# Patient Record
Sex: FEMALE | Race: WHITE | NOT HISPANIC OR LATINO | Employment: OTHER | URBAN - METROPOLITAN AREA
[De-identification: names, ages, dates, MRNs, and addresses within clinical notes are randomized per-mention and may not be internally consistent; named-entity substitution may affect disease eponyms.]

---

## 2023-05-18 PROBLEM — H90.3 SENSORINEURAL HEARING LOSS, BILATERAL: Status: ACTIVE | Noted: 2023-05-18

## 2023-05-18 PROBLEM — K21.9 ESOPHAGEAL REFLUX: Status: ACTIVE | Noted: 2023-05-18

## 2023-05-18 PROBLEM — G47.00 INSOMNIA: Status: ACTIVE | Noted: 2023-05-18

## 2023-05-18 PROBLEM — C64.9 RENAL CELL CARCINOMA (MULTI): Status: ACTIVE | Noted: 2023-05-18

## 2023-05-18 PROBLEM — R53.83 FATIGUE: Status: ACTIVE | Noted: 2023-05-18

## 2023-05-18 PROBLEM — M85.80 OSTEOPENIA: Status: ACTIVE | Noted: 2023-05-18

## 2023-05-18 PROBLEM — G43.909 MIGRAINE HEADACHE: Status: ACTIVE | Noted: 2023-05-18

## 2023-05-18 PROBLEM — F33.9 RECURRENT MAJOR DEPRESSIVE DISORDER (CMS-HCC): Status: ACTIVE | Noted: 2023-05-18

## 2023-05-18 PROBLEM — Z85.3 PERSONAL HISTORY OF BREAST CANCER: Status: ACTIVE | Noted: 2023-05-18

## 2023-05-18 PROBLEM — I10 HYPERTENSION: Status: ACTIVE | Noted: 2023-05-18

## 2023-05-18 NOTE — PROGRESS NOTES
Subjective   Patient ID: Jolanta Pickering is a 77 y.o. female.    HPI  Today for preop clearance for upcoming cataract surgery   Past medical history significant for  Personal history of breast cancer and renal cell cancer  Anxiety and depression  Chronic migraine headaches really with more ocular symptoms and usually gets about 1 weekly she knows how to control this they do not last long  Hypertension  Insomnia  Osteopenia      Review of Systems  Occasional chest pressure which she thinks is more food related not exertional    Objective   Physical Exam  Well-developed appears younger than her age in no acute distress  HEENT exam is unremarkable  Lungs are clear to auscultation and percussion  Cardiovascular regular rate and rhythm I detect no murmurs rubs or gallops  Periphery is without edema  There are no carotid bruits noted  EKG was performed and is normal  Lab Results   Component Value Date    WBC 4.9 01/31/2023    HGB 13.1 01/31/2023    HCT 40.6 01/31/2023    MCV 91 01/31/2023     01/31/2023     Lab Results   Component Value Date    GLUCOSE 83 01/31/2023    CALCIUM 8.8 01/31/2023     01/31/2023    K 3.8 01/31/2023    CO2 31 01/31/2023     01/31/2023    BUN 15 01/31/2023    CREATININE 0.75 01/31/2023      Assessment/Plan   #1 preop clearance for upcoming planned elective cataract surgery.  This patient is at no increased risk of morbidity or mortality for upcoming planned procedure she is medically cleared  2.  Hypertension excellent control continue current regimen some of this may be due to the recent weight loss which was intentional  3.  Migraine headaches stable  4.  Occasional chest discomfort I agree this probably is more gastroesophageal reflux she is going to monitor her symptoms more routinely  30 minutes were spent with patient of which greater than 50% was spent in counseling and coordination of care

## 2023-05-22 ENCOUNTER — OFFICE VISIT (OUTPATIENT)
Dept: PRIMARY CARE | Facility: CLINIC | Age: 78
End: 2023-05-22
Payer: MEDICARE

## 2023-05-22 VITALS
OXYGEN SATURATION: 98 % | SYSTOLIC BLOOD PRESSURE: 110 MMHG | BODY MASS INDEX: 27.46 KG/M2 | HEART RATE: 78 BPM | DIASTOLIC BLOOD PRESSURE: 80 MMHG | HEIGHT: 62 IN | WEIGHT: 149.2 LBS

## 2023-05-22 DIAGNOSIS — H26.9 CATARACT, UNSPECIFIED CATARACT TYPE, UNSPECIFIED LATERALITY: ICD-10-CM

## 2023-05-22 DIAGNOSIS — G43.809 OTHER MIGRAINE WITHOUT STATUS MIGRAINOSUS, NOT INTRACTABLE: ICD-10-CM

## 2023-05-22 DIAGNOSIS — Z00.00 ROUTINE HEALTH MAINTENANCE: ICD-10-CM

## 2023-05-22 DIAGNOSIS — R07.89 CHEST PRESSURE: Primary | ICD-10-CM

## 2023-05-22 DIAGNOSIS — I10 PRIMARY HYPERTENSION: ICD-10-CM

## 2023-05-22 DIAGNOSIS — K21.9 GASTROESOPHAGEAL REFLUX DISEASE, UNSPECIFIED WHETHER ESOPHAGITIS PRESENT: ICD-10-CM

## 2023-05-22 PROCEDURE — 1160F RVW MEDS BY RX/DR IN RCRD: CPT | Performed by: INTERNAL MEDICINE

## 2023-05-22 PROCEDURE — 3074F SYST BP LT 130 MM HG: CPT | Performed by: INTERNAL MEDICINE

## 2023-05-22 PROCEDURE — 99214 OFFICE O/P EST MOD 30 MIN: CPT | Performed by: INTERNAL MEDICINE

## 2023-05-22 PROCEDURE — 1159F MED LIST DOCD IN RCRD: CPT | Performed by: INTERNAL MEDICINE

## 2023-05-22 PROCEDURE — 93000 ELECTROCARDIOGRAM COMPLETE: CPT | Performed by: INTERNAL MEDICINE

## 2023-05-22 PROCEDURE — 3079F DIAST BP 80-89 MM HG: CPT | Performed by: INTERNAL MEDICINE

## 2023-05-22 RX ORDER — CITALOPRAM 20 MG/1
20 TABLET, FILM COATED ORAL
COMMUNITY
Start: 2019-09-16 | End: 2023-08-31 | Stop reason: SDUPTHER

## 2023-05-22 RX ORDER — TRETINOIN 0.25 MG/G
CREAM TOPICAL
COMMUNITY
Start: 2020-05-11 | End: 2024-01-12 | Stop reason: SDUPTHER

## 2023-05-22 RX ORDER — VERAPAMIL HYDROCHLORIDE 100 MG/1
2 CAPSULE, EXTENDED RELEASE ORAL NIGHTLY
COMMUNITY
Start: 2019-06-25 | End: 2023-10-12

## 2023-05-22 RX ORDER — BUPROPION HYDROCHLORIDE 150 MG/1
150 TABLET ORAL DAILY
COMMUNITY
Start: 2023-03-25 | End: 2024-03-08

## 2023-05-22 RX ORDER — TRAZODONE HYDROCHLORIDE 100 MG/1
100 TABLET ORAL
COMMUNITY
Start: 2020-01-30 | End: 2023-10-29

## 2023-05-22 RX ORDER — DOCUSATE SODIUM 100 MG/1
CAPSULE, LIQUID FILLED ORAL
COMMUNITY

## 2023-05-22 ASSESSMENT — PAIN SCALES - GENERAL: PAINLEVEL: 0-NO PAIN

## 2023-08-30 ENCOUNTER — TELEPHONE (OUTPATIENT)
Dept: PRIMARY CARE | Facility: CLINIC | Age: 78
End: 2023-08-30
Payer: MEDICARE

## 2023-08-30 NOTE — TELEPHONE ENCOUNTER
She is having aches and and pains and is coming in tomorrow but would you want to do any bw before?

## 2023-08-31 ENCOUNTER — APPOINTMENT (OUTPATIENT)
Dept: PRIMARY CARE | Facility: CLINIC | Age: 78
End: 2023-08-31
Payer: MEDICARE

## 2023-08-31 ENCOUNTER — LAB (OUTPATIENT)
Dept: LAB | Facility: LAB | Age: 78
End: 2023-08-31
Payer: MEDICARE

## 2023-08-31 ENCOUNTER — OFFICE VISIT (OUTPATIENT)
Dept: PRIMARY CARE | Facility: CLINIC | Age: 78
End: 2023-08-31
Payer: MEDICARE

## 2023-08-31 VITALS
SYSTOLIC BLOOD PRESSURE: 128 MMHG | TEMPERATURE: 98 F | BODY MASS INDEX: 26.52 KG/M2 | WEIGHT: 145 LBS | DIASTOLIC BLOOD PRESSURE: 72 MMHG

## 2023-08-31 DIAGNOSIS — M79.10 MYALGIA: Primary | ICD-10-CM

## 2023-08-31 DIAGNOSIS — R53.83 OTHER FATIGUE: ICD-10-CM

## 2023-08-31 DIAGNOSIS — M79.10 MYALGIA: ICD-10-CM

## 2023-08-31 DIAGNOSIS — F33.9 RECURRENT MAJOR DEPRESSIVE DISORDER, REMISSION STATUS UNSPECIFIED (CMS-HCC): ICD-10-CM

## 2023-08-31 LAB
ALANINE AMINOTRANSFERASE (SGPT) (U/L) IN SER/PLAS: 7 U/L (ref 7–45)
ALBUMIN (G/DL) IN SER/PLAS: 3.8 G/DL (ref 3.4–5)
ALKALINE PHOSPHATASE (U/L) IN SER/PLAS: 26 U/L (ref 33–136)
ANION GAP IN SER/PLAS: 8 MMOL/L (ref 10–20)
ASPARTATE AMINOTRANSFERASE (SGOT) (U/L) IN SER/PLAS: 10 U/L (ref 9–39)
BILIRUBIN TOTAL (MG/DL) IN SER/PLAS: 0.5 MG/DL (ref 0–1.2)
C REACTIVE PROTEIN (MG/L) IN SER/PLAS: <0.1 MG/DL
CALCIUM (MG/DL) IN SER/PLAS: 8.7 MG/DL (ref 8.6–10.3)
CARBON DIOXIDE, TOTAL (MMOL/L) IN SER/PLAS: 31 MMOL/L (ref 21–32)
CHLORIDE (MMOL/L) IN SER/PLAS: 103 MMOL/L (ref 98–107)
CREATINE KINASE (U/L) IN SER/PLAS: 35 U/L (ref 0–215)
CREATININE (MG/DL) IN SER/PLAS: 0.78 MG/DL (ref 0.5–1.05)
ERYTHROCYTE DISTRIBUTION WIDTH (RATIO) BY AUTOMATED COUNT: 12 % (ref 11.5–14.5)
ERYTHROCYTE MEAN CORPUSCULAR HEMOGLOBIN CONCENTRATION (G/DL) BY AUTOMATED: 31.3 G/DL (ref 32–36)
ERYTHROCYTE MEAN CORPUSCULAR VOLUME (FL) BY AUTOMATED COUNT: 93 FL (ref 80–100)
ERYTHROCYTES (10*6/UL) IN BLOOD BY AUTOMATED COUNT: 4.15 X10E12/L (ref 4–5.2)
GFR FEMALE: 79 ML/MIN/1.73M2
GLUCOSE (MG/DL) IN SER/PLAS: 80 MG/DL (ref 74–99)
HEMATOCRIT (%) IN BLOOD BY AUTOMATED COUNT: 38.6 % (ref 36–46)
HEMOGLOBIN (G/DL) IN BLOOD: 12.1 G/DL (ref 12–16)
LEUKOCYTES (10*3/UL) IN BLOOD BY AUTOMATED COUNT: 4.5 X10E9/L (ref 4.4–11.3)
PLATELETS (10*3/UL) IN BLOOD AUTOMATED COUNT: 192 X10E9/L (ref 150–450)
POTASSIUM (MMOL/L) IN SER/PLAS: 3.8 MMOL/L (ref 3.5–5.3)
PROTEIN TOTAL: 6.3 G/DL (ref 6.4–8.2)
SEDIMENTATION RATE, ERYTHROCYTE: 8 MM/H (ref 0–30)
SODIUM (MMOL/L) IN SER/PLAS: 138 MMOL/L (ref 136–145)
THYROTROPIN (MIU/L) IN SER/PLAS BY DETECTION LIMIT <= 0.05 MIU/L: 2.13 MIU/L (ref 0.44–3.98)
UREA NITROGEN (MG/DL) IN SER/PLAS: 16 MG/DL (ref 6–23)

## 2023-08-31 PROCEDURE — 3078F DIAST BP <80 MM HG: CPT | Performed by: INTERNAL MEDICINE

## 2023-08-31 PROCEDURE — 85652 RBC SED RATE AUTOMATED: CPT

## 2023-08-31 PROCEDURE — 84443 ASSAY THYROID STIM HORMONE: CPT

## 2023-08-31 PROCEDURE — 86140 C-REACTIVE PROTEIN: CPT

## 2023-08-31 PROCEDURE — 85027 COMPLETE CBC AUTOMATED: CPT

## 2023-08-31 PROCEDURE — 82550 ASSAY OF CK (CPK): CPT

## 2023-08-31 PROCEDURE — 1126F AMNT PAIN NOTED NONE PRSNT: CPT | Performed by: INTERNAL MEDICINE

## 2023-08-31 PROCEDURE — 80053 COMPREHEN METABOLIC PANEL: CPT

## 2023-08-31 PROCEDURE — 1036F TOBACCO NON-USER: CPT | Performed by: INTERNAL MEDICINE

## 2023-08-31 PROCEDURE — 36415 COLL VENOUS BLD VENIPUNCTURE: CPT

## 2023-08-31 PROCEDURE — 99213 OFFICE O/P EST LOW 20 MIN: CPT | Performed by: INTERNAL MEDICINE

## 2023-08-31 PROCEDURE — 3074F SYST BP LT 130 MM HG: CPT | Performed by: INTERNAL MEDICINE

## 2023-08-31 PROCEDURE — 1160F RVW MEDS BY RX/DR IN RCRD: CPT | Performed by: INTERNAL MEDICINE

## 2023-08-31 PROCEDURE — 1159F MED LIST DOCD IN RCRD: CPT | Performed by: INTERNAL MEDICINE

## 2023-08-31 RX ORDER — CITALOPRAM 20 MG/1
20 TABLET, FILM COATED ORAL
Qty: 90 TABLET | Refills: 3 | Status: SHIPPED | OUTPATIENT
Start: 2023-08-31

## 2023-08-31 NOTE — PROGRESS NOTES
Subjective   Patient ID: Jolanta Pickering is a 77 y.o. female.    HPI  1 week of feeling poorly  achy all over seems to feel like it is worse at the end of the night  She does not feel her strength has been affected  She denies fevers or chills  Energy level has been low as well which is unlike her  Occasional headache no change in vision  She remains very active but just time she has felt like she is going to cancel her normal activities    Review of Systems    Objective   Physical Exam  Well-developed appears younger than age no acute distress  HEENT exam is unremarkable the temporal arteries are not palpable  Lungs are clear  Cardiovascular regular rate and rhythm  Periphery without edema  There is no joint erythema or swelling noted there is good range of motion there is some pain to palpation bilaterally in the greater trochanters strength is 5 of 5 without deficits noted and gait is normal  Assessment/Plan     #1Myalgias more than arthralgias and fatigue etiology unclear no fevers no chills just not feeling herself I would like to do some routine blood work does not sound like PMR as seems to be worse in the evening her strength is good but we will check sed rate CRP CBC thyroid functions and comprehensive metabolic profile certainly possible this is some nondescript viral syndrome as well  2.  Hypertension good control continue current regimen  3.  Weight loss intentional congratulated her on her intentional weight loss  20 minutes were spent with patient of which greater than 50% was spent in counseling and coordination of care  This note was partially generated using the Dragon voice recognition system.  There may be some incorrect wording ,grammar, spelling or punctuation errors that were not corrected prior to committing the note to the medical record.

## 2023-08-31 NOTE — PROGRESS NOTES
Attempted to call the patient to get her scheduled. Unable to LVM.    Body aches, headache, fatigue for a week

## 2023-09-28 ENCOUNTER — TELEPHONE (OUTPATIENT)
Dept: PRIMARY CARE | Facility: CLINIC | Age: 78
End: 2023-09-28
Payer: MEDICARE

## 2023-09-28 NOTE — TELEPHONE ENCOUNTER
Do you want her the get the RSV vaccine?   Pt arrives to the ED via EMS with hypoxia. EMS reports that the pt was found by her nurse at [facility] with her oxygen saturation at 70% on room air. The pt denies shortness of breath nor chest pain. The pt states that she tested positive for covid two days ago.

## 2023-10-11 DIAGNOSIS — G43.119 INTRACTABLE MIGRAINE WITH AURA WITHOUT STATUS MIGRAINOSUS: Primary | ICD-10-CM

## 2023-10-12 RX ORDER — VERAPAMIL HYDROCHLORIDE 100 MG/1
100 CAPSULE, EXTENDED RELEASE ORAL DAILY
Qty: 90 CAPSULE | Refills: 3 | Status: SHIPPED | OUTPATIENT
Start: 2023-10-12

## 2023-10-28 DIAGNOSIS — F51.01 PRIMARY INSOMNIA: Primary | ICD-10-CM

## 2023-10-29 RX ORDER — TRAZODONE HYDROCHLORIDE 100 MG/1
TABLET ORAL
Qty: 180 TABLET | Refills: 0 | Status: SHIPPED | OUTPATIENT
Start: 2023-10-29 | End: 2024-03-08

## 2023-11-17 ENCOUNTER — ANCILLARY PROCEDURE (OUTPATIENT)
Dept: RADIOLOGY | Facility: CLINIC | Age: 78
End: 2023-11-17
Payer: MEDICARE

## 2023-11-17 DIAGNOSIS — Z12.31 ENCOUNTER FOR SCREENING MAMMOGRAM FOR MALIGNANT NEOPLASM OF BREAST: ICD-10-CM

## 2023-11-17 PROCEDURE — 77067 SCR MAMMO BI INCL CAD: CPT

## 2023-11-17 PROCEDURE — 77063 BREAST TOMOSYNTHESIS BI: CPT | Mod: BILATERAL PROCEDURE | Performed by: RADIOLOGY

## 2023-11-17 PROCEDURE — 77067 SCR MAMMO BI INCL CAD: CPT | Mod: BILATERAL PROCEDURE | Performed by: RADIOLOGY

## 2023-12-30 ENCOUNTER — LAB (OUTPATIENT)
Dept: LAB | Facility: LAB | Age: 78
End: 2023-12-30
Payer: MEDICARE

## 2024-01-03 DIAGNOSIS — Z00.00 ROUTINE HEALTH MAINTENANCE: ICD-10-CM

## 2024-01-08 ENCOUNTER — LAB (OUTPATIENT)
Dept: LAB | Facility: LAB | Age: 79
End: 2024-01-08
Payer: MEDICARE

## 2024-01-08 DIAGNOSIS — Z00.00 ROUTINE HEALTH MAINTENANCE: ICD-10-CM

## 2024-01-08 LAB
25(OH)D3 SERPL-MCNC: 62 NG/ML (ref 30–100)
ALBUMIN SERPL BCP-MCNC: 3.9 G/DL (ref 3.4–5)
ALP SERPL-CCNC: 27 U/L (ref 33–136)
ALT SERPL W P-5'-P-CCNC: 10 U/L (ref 7–45)
ANION GAP SERPL CALC-SCNC: 12 MMOL/L (ref 10–20)
AST SERPL W P-5'-P-CCNC: 11 U/L (ref 9–39)
BASOPHILS # BLD AUTO: 0.03 X10*3/UL (ref 0–0.1)
BASOPHILS NFR BLD AUTO: 0.6 %
BILIRUB SERPL-MCNC: 0.4 MG/DL (ref 0–1.2)
BUN SERPL-MCNC: 24 MG/DL (ref 6–23)
CALCIUM SERPL-MCNC: 9.2 MG/DL (ref 8.6–10.6)
CHLORIDE SERPL-SCNC: 105 MMOL/L (ref 98–107)
CHOLEST SERPL-MCNC: 179 MG/DL (ref 0–199)
CHOLESTEROL/HDL RATIO: 4.2
CO2 SERPL-SCNC: 30 MMOL/L (ref 21–32)
CREAT SERPL-MCNC: 0.88 MG/DL (ref 0.5–1.05)
CRP SERPL HS-MCNC: 0.6 MG/L
EGFRCR SERPLBLD CKD-EPI 2021: 68 ML/MIN/1.73M*2
EOSINOPHIL # BLD AUTO: 0.03 X10*3/UL (ref 0–0.4)
EOSINOPHIL NFR BLD AUTO: 0.6 %
ERYTHROCYTE [DISTWIDTH] IN BLOOD BY AUTOMATED COUNT: 12 % (ref 11.5–14.5)
EST. AVERAGE GLUCOSE BLD GHB EST-MCNC: 103 MG/DL
GLUCOSE SERPL-MCNC: 88 MG/DL (ref 74–99)
HBA1C MFR BLD: 5.2 %
HCT VFR BLD AUTO: 38.4 % (ref 36–46)
HDLC SERPL-MCNC: 42.4 MG/DL
HGB BLD-MCNC: 12.1 G/DL (ref 12–16)
IMM GRANULOCYTES # BLD AUTO: 0.01 X10*3/UL (ref 0–0.5)
IMM GRANULOCYTES NFR BLD AUTO: 0.2 % (ref 0–0.9)
LDLC SERPL CALC-MCNC: 120 MG/DL
LYMPHOCYTES # BLD AUTO: 1.53 X10*3/UL (ref 0.8–3)
LYMPHOCYTES NFR BLD AUTO: 30.2 %
MCH RBC QN AUTO: 29.3 PG (ref 26–34)
MCHC RBC AUTO-ENTMCNC: 31.5 G/DL (ref 32–36)
MCV RBC AUTO: 93 FL (ref 80–100)
MONOCYTES # BLD AUTO: 0.46 X10*3/UL (ref 0.05–0.8)
MONOCYTES NFR BLD AUTO: 9.1 %
NEUTROPHILS # BLD AUTO: 3.01 X10*3/UL (ref 1.6–5.5)
NEUTROPHILS NFR BLD AUTO: 59.3 %
NON HDL CHOLESTEROL: 137 MG/DL (ref 0–149)
NRBC BLD-RTO: 0 /100 WBCS (ref 0–0)
PLATELET # BLD AUTO: 209 X10*3/UL (ref 150–450)
POTASSIUM SERPL-SCNC: 4.3 MMOL/L (ref 3.5–5.3)
PROT SERPL-MCNC: 6.3 G/DL (ref 6.4–8.2)
RBC # BLD AUTO: 4.13 X10*6/UL (ref 4–5.2)
SODIUM SERPL-SCNC: 143 MMOL/L (ref 136–145)
TRIGL SERPL-MCNC: 82 MG/DL (ref 0–149)
TSH SERPL-ACNC: 2.55 MIU/L (ref 0.44–3.98)
VLDL: 16 MG/DL (ref 0–40)
WBC # BLD AUTO: 5.1 X10*3/UL (ref 4.4–11.3)

## 2024-01-08 PROCEDURE — 36415 COLL VENOUS BLD VENIPUNCTURE: CPT

## 2024-01-08 PROCEDURE — 83036 HEMOGLOBIN GLYCOSYLATED A1C: CPT

## 2024-01-08 PROCEDURE — 82306 VITAMIN D 25 HYDROXY: CPT

## 2024-01-08 PROCEDURE — 86141 C-REACTIVE PROTEIN HS: CPT

## 2024-01-08 PROCEDURE — 85025 COMPLETE CBC W/AUTO DIFF WBC: CPT

## 2024-01-08 PROCEDURE — 80061 LIPID PANEL: CPT

## 2024-01-08 PROCEDURE — 84443 ASSAY THYROID STIM HORMONE: CPT

## 2024-01-08 PROCEDURE — 80053 COMPREHEN METABOLIC PANEL: CPT

## 2024-01-08 ASSESSMENT — PROMIS GLOBAL HEALTH SCALE
RATE_SOCIAL_SATISFACTION: EXCELLENT
RATE_MENTAL_HEALTH: FAIR
RATE_AVERAGE_FATIGUE: MODERATE
RATE_GENERAL_HEALTH: VERY GOOD
EMOTIONAL_PROBLEMS: RARELY
CARRYOUT_PHYSICAL_ACTIVITIES: COMPLETELY
RATE_AVERAGE_PAIN: 1
RATE_QUALITY_OF_LIFE: EXCELLENT
RATE_PHYSICAL_HEALTH: GOOD
CARRYOUT_SOCIAL_ACTIVITIES: VERY GOOD

## 2024-01-09 NOTE — PROGRESS NOTES
Physical Exam    Name Jolanta Pickering    Date of Service :1/12/2024      Jolanta Pickering  78 y.o. is here for an annual physical exam  Past medical history is significant for  Breast cancer stage II 2014 lumpectomy left breast she did have radiation therapy and chemotherapy was on anastrozole   followed with oncology for a while until Dr. Alegria retired  Osteopenia had been on both alendronate and Reclast in the past probably because of the anastrozole last bone density 2023 showing worst T-score of -1.7 in the hip  Renal cell cancer 2005 status post partial nephrectomy this was found incidentally she has had no repeat occurrence  Ascending aortic aneurysm/dilatation which is followed with biannual CT scan last CT scan was 2022 plan to repeat this year August 2024 sees Dr Pepito Simon  She did have a cholecystectomy around 2017  Remote history of oophorectomy maybe for a cyst she is unsure  Headaches has been on verapamil for the headaches as well as controlling her blood pressure she did see a migraine specialist at 1 time was using too much Fioricet  Anxiety and depression citalopram had worked pretty well had been on Wellbutrin for a time after the death of her  she actually feels quite well with her current regimen she remains very active  She did last have a colonoscopy in 2018 question of repeat in 5 years but this colonoscopy was clean  Trazodone has worked well for her chronic insomnia  She is with chronic constipation takes MiraLAX on a as needed basis but sometimes will go a whole week without having a bowel movement  Overall she is feeling very well and grateful for her health and for her family  She remains very active and remains very engaged with her friend she plays bridge and multiple other activities  She will be going [place] a big cruise [place] [facility] [place] near future    She had cataract surgery this year which was very successful and pleased as well as did get a hearing aid which  has been very good as well          Past Medical History:   Diagnosis Date    Cyst of kidney, acquired     Cyst, kidney, acquired    Malignant neoplasm of upper-outer quadrant of unspecified female breast (CMS/HCC) 06/15/2014    Malignant neoplasm of upper outer quadrant of female breast    Other conditions influencing health status 06/30/2014    Seroma infection, postoperative    Personal history of diseases of the blood and blood-forming organs and certain disorders involving the immune mechanism     History of anemia    Personal history of malignant neoplasm of other organs and systems 12/30/2013    History of secondary malignant neoplasm    Personal history of other (healed) physical injury and trauma 10/14/2013    History of strain of back    Personal history of other endocrine, nutritional and metabolic disease 09/09/2014    History of hypokalemia    Personal history of other endocrine, nutritional and metabolic disease     History of hyperlipidemia    Personal history of other mental and behavioral disorders 08/08/2014    History of anxiety disorder    Personal history of other specified conditions 10/14/2013    History of diarrhea    Personal history of other specified conditions 12/23/2013    History of breast lump    Personal history of other specified conditions 12/23/2013    History of lymphadenopathy    Personal history of other specified conditions 10/14/2013    History of solitary pulmonary nodule    Secondary and unspecified malignant neoplasm of lymph node, 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    Tachycardia, unspecified 05/21/2014    Tachycardia    Vitamin D deficiency, unspecified     Vitamin D deficiency       Past Surgical History:   Procedure Laterality Date    BREAST LUMPECTOMY Left 06/30/2014    Breast Surgery Lumpectomy    CT ABDOMEN PELVIS ANGIOGRAM W AND/OR WO IV CONTRAST  10/18/2013    CT ABDOMEN PELVIS ANGIOGRAM W AND/OR WO IV CONTRAST 10/18/2013 [facility] ANCILLARY  "LEGACY    INCISIONAL BREAST BIOPSY  2013    Incisional Breast Biopsy    OTHER SURGICAL HISTORY  2013    Partial Nephrectomy    REFRACTIVE SURGERY  2013    Corneal LASIK    TUBAL LIGATION  2013    Tubal Ligation        Social History     Tobacco Use    Smoking status: Never    Smokeless tobacco: Never   Vaping Use    Vaping Use: Never used        Social History     Social History Narrative    From  since 2016     She has 2 sons 5 grandchildren 2 granddaughters and 3 grandsons    1 son and 3 grandchildren are here the other son and 2 grandchildren living in [place]    Retired  teacher  education     but worked in [employer]    Her diet is healthy overall    She does not exercise routinely but is active at home and walks her dog    She has never been a smoker    She does not drink alcohol       Family History   Problem Relation Name Age of Onset    Breast cancer Mother           at 90    Transient ischemic attack Father           90    Breast cancer Sister      Hodgkin's lymphoma Son          /78   Ht 1.575 m (5' 2\")   Wt 66.2 kg (146 lb)   LMP  (LMP Unknown)   BMI 26.70 kg/m²     Physical Exam  Physical examination  Reveals a well-developed woman in no acute distress    appearance is younger than age no acute distress  HEENT exam  Extraocular motion is intact  Tympanic membranes and external auditory canals are normal  Oropharynx is normal  There is no cervical lymphadenopathy appreciated  The thyroid is within normal limits    Lungs    clear to auscultation and percussion    Cardiovascular   regular rate and rhythm  No murmurs rubs or gallops are appreciated    Breast examination   No dominant masses nipple discharge or axillary lymphadenopathy is appreciated  Scar from remote lumpectomy left breast    Abdomen   soft nontender bowel sounds are positive   there is no organomegaly noted      Periphery  Pulses are present without deficits noted  No " "peripheral edema is noted  There are varicosities present    Musculoskeletal  Gait is normal  Is no joint erythema or swelling noted  Range of motion is within normal limits  Strength is 5 of 5 without deficits noted    Dermatology  No concerning skin lesions are noted there are a few hyperpigmented skin lesions present    Neurology  No deficits are noted  Judgment appears appropriate  Mood and affect are appropriate         Results from last 7 days   Lab Units 01/08/24  1029   WBC AUTO x10*3/uL 5.1   HEMOGLOBIN g/dL 12.1   HEMATOCRIT % 38.4   PLATELETS AUTO x10*3/uL 209   NEUTROS PCT AUTO % 59.3   LYMPHS PCT AUTO % 30.2   MONOS PCT AUTO % 9.1   EOS PCT AUTO % 0.6      Results from last 7 days   Lab Units 01/08/24  1029   HEMOGLOBIN A1C % 5.2     Results from last 7 days   Lab Units 01/08/24  1029   SODIUM mmol/L 143   POTASSIUM mmol/L 4.3   CHLORIDE mmol/L 105   CO2 mmol/L 30   BUN mg/dL 24*   CREATININE mg/dL 0.88   CALCIUM mg/dL 9.2   PROTEIN TOTAL g/dL 6.3*   BILIRUBIN TOTAL mg/dL 0.4   ALK PHOS U/L 27*   ALT U/L 10   AST U/L 11   GLUCOSE mg/dL 88      Results from last 7 days   Lab Units 01/08/24  1029   CHOLESTEROL mg/dL 179   TRIGLYCERIDES mg/dL 82   HDL mg/dL 42.4           Results from last 7 days   Lab Units 01/08/24  1029   TSH mIU/L 2.55           No lab exists for component: \"UAPPEAR\", \"UCOLOR\"  No components found for: \"UA\"  Lab on 01/08/2024   Component Date Value Ref Range Status    WBC 01/08/2024 5.1  4.4 - 11.3 x10*3/uL Final    nRBC 01/08/2024 0.0  0.0 - 0.0 /100 WBCs Final    RBC 01/08/2024 4.13  4.00 - 5.20 x10*6/uL Final    Hemoglobin 01/08/2024 12.1  12.0 - 16.0 g/dL Final    Hematocrit 01/08/2024 38.4  36.0 - 46.0 % Final    MCV 01/08/2024 93  80 - 100 fL Final    MCH 01/08/2024 29.3  26.0 - 34.0 pg Final    MCHC 01/08/2024 31.5 (L)  32.0 - 36.0 g/dL Final    RDW 01/08/2024 12.0  11.5 - 14.5 % Final    Platelets 01/08/2024 209  150 - 450 x10*3/uL Final    Platelet count verified by smear " review.    Neutrophils % 01/08/2024 59.3  40.0 - 80.0 % Final    Immature Granulocytes %, Automated 01/08/2024 0.2  0.0 - 0.9 % Final    Immature Granulocyte Count (IG) includes promyelocytes, myelocytes and metamyelocytes but does not include bands. Percent differential counts (%) should be interpreted in the context of the absolute cell counts (cells/UL).    Lymphocytes % 01/08/2024 30.2  13.0 - 44.0 % Final    Monocytes % 01/08/2024 9.1  2.0 - 10.0 % Final    Eosinophils % 01/08/2024 0.6  0.0 - 6.0 % Final    Basophils % 01/08/2024 0.6  0.0 - 2.0 % Final    Neutrophils Absolute 01/08/2024 3.01  1.60 - 5.50 x10*3/uL Final    Percent differential counts (%) should be interpreted in the context of the absolute cell counts (cells/uL).    Immature Granulocytes Absolute, Au* 01/08/2024 0.01  0.00 - 0.50 x10*3/uL Final    Lymphocytes Absolute 01/08/2024 1.53  0.80 - 3.00 x10*3/uL Final    Monocytes Absolute 01/08/2024 0.46  0.05 - 0.80 x10*3/uL Final    Eosinophils Absolute 01/08/2024 0.03  0.00 - 0.40 x10*3/uL Final    Basophils Absolute 01/08/2024 0.03  0.00 - 0.10 x10*3/uL Final    Glucose 01/08/2024 88  74 - 99 mg/dL Final    Sodium 01/08/2024 143  136 - 145 mmol/L Final    Potassium 01/08/2024 4.3  3.5 - 5.3 mmol/L Final    Chloride 01/08/2024 105  98 - 107 mmol/L Final    Bicarbonate 01/08/2024 30  21 - 32 mmol/L Final    Anion Gap 01/08/2024 12  10 - 20 mmol/L Final    Urea Nitrogen 01/08/2024 24 (H)  6 - 23 mg/dL Final    Creatinine 01/08/2024 0.88  0.50 - 1.05 mg/dL Final    eGFR 01/08/2024 68  >60 mL/min/1.73m*2 Final    Calculations of estimated GFR are performed using the 2021 CKD-EPI Study Refit equation without the race variable for the IDMS-Traceable creatinine methods.  https://jasn.asnjournals.org/content/early/2021/09/22/ASN.1848418636    Calcium 01/08/2024 9.2  8.6 - 10.6 mg/dL Final    Albumin 01/08/2024 3.9  3.4 - 5.0 g/dL Final    Alkaline Phosphatase 01/08/2024 27 (L)  33 - 136 U/L Final    Total  Protein 01/08/2024 6.3 (L)  6.4 - 8.2 g/dL Final    AST 01/08/2024 11  9 - 39 U/L Final    Bilirubin, Total 01/08/2024 0.4  0.0 - 1.2 mg/dL Final    ALT 01/08/2024 10  7 - 45 U/L Final    Patients treated with Sulfasalazine may generate falsely decreased results for ALT.    CRP, High Sensitivity 01/08/2024 0.6  <1.0 mg/L Final    Hemoglobin A1C 01/08/2024 5.2  see below % Final    Estimated Average Glucose 01/08/2024 103  Not Established mg/dL Final    Cholesterol 01/08/2024 179  0 - 199 mg/dL Final          Age      Desirable   Borderline High   High     0-19 Y     0 - 169       170 - 199     >/= 200    20-24 Y     0 - 189       190 - 224     >/= 225         >24 Y     0 - 199       200 - 239     >/= 240   **All ranges are based on fasting samples. Specific   therapeutic targets will vary based on patient-specific   cardiac risk.    Pediatric guidelines reference:Pediatrics 2011, 128(S5).Adult guidelines reference: NCEP ATPIII Guidelines,SHANIKA 2001, 258:2486-97    Venipuncture immediately after or during the administration of Metamizole may lead to falsely low results. Testing should be performed immediately prior to Metamizole dosing.    HDL-Cholesterol 01/08/2024 42.4  mg/dL Final      Age       Very Low   Low     Normal    High    0-19 Y    < 35      < 40     40-45     ----  20-24 Y    ----     < 40      >45      ----        >24 Y      ----     < 40     40-60      >60      Cholesterol/HDL Ratio 01/08/2024 4.2   Final      Ref Values  Desirable  < 3.4  High Risk  > 5.0    LDL Calculated 01/08/2024 120 (H)  <=99 mg/dL Final                                Near   Borderline      AGE      Desirable  Optimal    High     High     Very High     0-19 Y     0 - 109     ---    110-129   >/= 130     ----    20-24 Y     0 - 119     ---    120-159   >/= 160     ----      >24 Y     0 -  99   100-129  130-159   160-189     >/=190      VLDL 01/08/2024 16  0 - 40 mg/dL Final    Triglycerides 01/08/2024 82  0 - 149 mg/dL Final        Age         Desirable   Borderline High   High     Very High   0 D-90 D    19 - 174         ----         ----        ----  91 D- 9 Y     0 -  74        75 -  99     >/= 100      ----    10-19 Y     0 -  89        90 - 129     >/= 130      ----    20-24 Y     0 - 114       115 - 149     >/= 150      ----         >24 Y     0 - 149       150 - 199    200- 499    >/= 500    Venipuncture immediately after or during the administration of Metamizole may lead to falsely low results. Testing should be performed immediately prior to Metamizole dosing.    Non HDL Cholesterol 01/08/2024 137  0 - 149 mg/dL Final          Age       Desirable   Borderline High   High     Very High     0-19 Y     0 - 119       120 - 144     >/= 145    >/= 160    20-24 Y     0 - 149       150 - 189     >/= 190      ----         >24 Y    30 mg/dL above LDL Cholesterol goal      Thyroid Stimulating Hormone 01/08/2024 2.55  0.44 - 3.98 mIU/L Final    Vitamin D, 25-Hydroxy, Total 01/08/2024 62  30 - 100 ng/mL Final     [unfilled]   No results found.   The 10-year ASCVD risk score (Nia SOUTH, et al., 2019) is: 19.7%    Values used to calculate the score:      Age: 78 years      Sex: Female      Is Non- : No      Diabetic: No      Tobacco smoker: No      Systolic Blood Pressure: 118 mmHg      Is BP treated: Yes      HDL Cholesterol: 42.4 mg/dL      Total Cholesterol: 179 mg/dL   .    Problem List Items Addressed This Visit       Insomnia    Personal history of breast cancer    Recurrent major depressive disorder (CMS/HCC)    Sensorineural hearing loss, bilateral     Other Visit Diagnoses       Motion sickness, subsequent encounter    -  Primary    Relevant Medications    scopolamine (Transderm-Scop) 1 mg over 3 days patch 3 day    Adult acne        Relevant Medications    tretinoin (Retin-A) 0.025 % cream            Assessment/Plan   1 hypercholesteremia cholesterol is acceptable we will continue her current regimen LDL is  120  2.  Hypertension good control with her current regimen  3.  Headache still gets more ocular migraines but not debilitating headaches any longer continue current regimen had been started on verapamil sometime ago  4.  Remote history of breast cancer with no evidence of recurrence she did have mammogram performed in November continue with yearly screening  5.  History of renal cancer incidentally found no evidence of free currents had partial nephrectomy  6.  Insomnia markedly improved with trazodone we did discuss trying magnesium glycinate as well which may help with her constipation  7.  Constipation which has been chronic in nature recommended magnesium glycinate 200 to 400 mg at bedtime that she can take with the trazodone she could take MiraLAX daily if indicated but certainly as needed is fine increase water intake as well as really not a good water drinker  8.  Health maintenance  Overall she is extraordinarily healthy  Increased routine regular exercise is encouraged  Increased water intake  Up to date with all immunizations including flu RSV COVID  Mammogram yearly  She does see dermatology for yearly skin checks  9.  Osteopenia mild bone density has been very stable and she just completed a bone density this year

## 2024-01-12 ENCOUNTER — OFFICE VISIT (OUTPATIENT)
Dept: PRIMARY CARE | Facility: CLINIC | Age: 79
End: 2024-01-12
Payer: MEDICARE

## 2024-01-12 VITALS
SYSTOLIC BLOOD PRESSURE: 118 MMHG | HEIGHT: 62 IN | WEIGHT: 146 LBS | BODY MASS INDEX: 26.87 KG/M2 | DIASTOLIC BLOOD PRESSURE: 78 MMHG

## 2024-01-12 DIAGNOSIS — F33.42 RECURRENT MAJOR DEPRESSIVE DISORDER, IN FULL REMISSION (CMS-HCC): ICD-10-CM

## 2024-01-12 DIAGNOSIS — T75.3XXD MOTION SICKNESS, SUBSEQUENT ENCOUNTER: Primary | ICD-10-CM

## 2024-01-12 DIAGNOSIS — F51.01 PRIMARY INSOMNIA: ICD-10-CM

## 2024-01-12 DIAGNOSIS — Z85.3 PERSONAL HISTORY OF BREAST CANCER: ICD-10-CM

## 2024-01-12 DIAGNOSIS — L70.9 ADULT ACNE: ICD-10-CM

## 2024-01-12 DIAGNOSIS — H90.3 SENSORINEURAL HEARING LOSS, BILATERAL: ICD-10-CM

## 2024-01-12 PROCEDURE — 3078F DIAST BP <80 MM HG: CPT | Performed by: INTERNAL MEDICINE

## 2024-01-12 PROCEDURE — 1159F MED LIST DOCD IN RCRD: CPT | Performed by: INTERNAL MEDICINE

## 2024-01-12 PROCEDURE — 1036F TOBACCO NON-USER: CPT | Performed by: INTERNAL MEDICINE

## 2024-01-12 PROCEDURE — 3074F SYST BP LT 130 MM HG: CPT | Performed by: INTERNAL MEDICINE

## 2024-01-12 PROCEDURE — 1126F AMNT PAIN NOTED NONE PRSNT: CPT | Performed by: INTERNAL MEDICINE

## 2024-01-12 PROCEDURE — UHSPHYS PR UH SELECT PHYSICAL: Performed by: INTERNAL MEDICINE

## 2024-01-12 PROCEDURE — 1160F RVW MEDS BY RX/DR IN RCRD: CPT | Performed by: INTERNAL MEDICINE

## 2024-01-12 RX ORDER — TRETINOIN 0.25 MG/G
CREAM TOPICAL NIGHTLY
Qty: 20 G | Refills: 2 | Status: SHIPPED | OUTPATIENT
Start: 2024-01-12

## 2024-01-12 RX ORDER — SCOLOPAMINE TRANSDERMAL SYSTEM 1 MG/1
1 PATCH, EXTENDED RELEASE TRANSDERMAL
Qty: 8 PATCH | Refills: 0 | Status: SHIPPED | OUTPATIENT
Start: 2024-01-12

## 2024-01-12 NOTE — PATIENT INSTRUCTIONS
It was good to see you.  You are doing a good job with your overall health care.   Blood work is all acceptable  You are up-to-date with mammogram  Bone density has been very stable showing only osteopenia we will plan to recheck in 2025  You are up-to-date with all immunizations  Increased routine regular exercise is encouraged  From the constipation standpoint lets try magnesium glycinate 200 to 400 mg at bedtime every night and then you can use MiraLAX more on an as-needed basis perhaps every 3-4 nights but if needed MiraLAX can be taken on a daily basis as well  I did send prescription for the Transderm scopolamine patches to the pharmacy as well as Retin-A  Increase free water intake is recommended recommendations are for 64 ounces daily however I would be happy if you would get 48 ounces and daily    I encourage you to stay active and healthy, and to follow these healthy habits:     Try to increase your intake of fish such as salmon and tuna and try to get 2 to 3 servings of fish per week.  Increase your intake of plant-based protein listed here:    Unprocessed nuts, walnuts, or almonds, Nuts and Seeds.      Green vegetables such as Broccoli, Peas, Greens, Spinach    Beans, Chickpeas, & Lentils    Other sources include Potatoes, Quinoa, Seaweed, Soymilk, Tempeh, and Tofu.  Increase food s higher in flavonoids found in black tea, green tea, apples, nuts, citrus fruit, berries, and dark chocolate.  You should avoid fried foods, and sugary or starchy foods and sugary drinks, and avoid saturated fats.    Try not to dine at restaurants frequently and avoid fast food restaurants.      Try to get restful sleep approximately 7-9 hours every night.  Work towards getting 30 minutes of  moderate intensity exercise 4 to 5 days per week.  You should also try to exercise at least one hour per day with light walking.

## 2024-03-08 DIAGNOSIS — F33.0 MILD EPISODE OF RECURRENT MAJOR DEPRESSIVE DISORDER (CMS-HCC): Primary | ICD-10-CM

## 2024-03-08 DIAGNOSIS — F51.01 PRIMARY INSOMNIA: ICD-10-CM

## 2024-03-08 RX ORDER — BUPROPION HYDROCHLORIDE 150 MG/1
150 TABLET ORAL DAILY
Qty: 90 TABLET | Refills: 0 | Status: SHIPPED | OUTPATIENT
Start: 2024-03-08 | End: 2024-06-08

## 2024-03-08 RX ORDER — TRAZODONE HYDROCHLORIDE 100 MG/1
TABLET ORAL
Qty: 180 TABLET | Refills: 0 | Status: SHIPPED | OUTPATIENT
Start: 2024-03-08 | End: 2024-06-08

## 2024-06-08 DIAGNOSIS — F33.0 MILD EPISODE OF RECURRENT MAJOR DEPRESSIVE DISORDER (CMS-HCC): ICD-10-CM

## 2024-06-08 DIAGNOSIS — F51.01 PRIMARY INSOMNIA: ICD-10-CM

## 2024-06-08 RX ORDER — BUPROPION HYDROCHLORIDE 150 MG/1
150 TABLET ORAL DAILY
Qty: 90 TABLET | Refills: 0 | Status: SHIPPED | OUTPATIENT
Start: 2024-06-08

## 2024-06-08 RX ORDER — TRAZODONE HYDROCHLORIDE 100 MG/1
TABLET ORAL
Qty: 180 TABLET | Refills: 0 | Status: SHIPPED | OUTPATIENT
Start: 2024-06-08

## 2024-06-10 DIAGNOSIS — R26.81 GAIT INSTABILITY: Primary | ICD-10-CM

## 2024-06-20 DIAGNOSIS — I71.21 ANEURYSM OF ASCENDING AORTA WITHOUT RUPTURE (CMS-HCC): Primary | ICD-10-CM

## 2024-06-28 ENCOUNTER — APPOINTMENT (OUTPATIENT)
Dept: CARDIAC SURGERY | Facility: CLINIC | Age: 79
End: 2024-06-28
Payer: MEDICARE

## 2024-07-05 ENCOUNTER — HOSPITAL ENCOUNTER (OUTPATIENT)
Dept: RADIOLOGY | Facility: CLINIC | Age: 79
Discharge: HOME | End: 2024-07-05
Payer: MEDICARE

## 2024-07-05 DIAGNOSIS — I71.21 ANEURYSM OF ASCENDING AORTA WITHOUT RUPTURE (CMS-HCC): ICD-10-CM

## 2024-07-05 PROCEDURE — 71250 CT THORAX DX C-: CPT

## 2024-07-12 ENCOUNTER — HOSPITAL ENCOUNTER (OUTPATIENT)
Dept: CARDIOLOGY | Facility: CLINIC | Age: 79
Discharge: HOME | End: 2024-07-12
Payer: MEDICARE

## 2024-07-12 DIAGNOSIS — I71.21 ANEURYSM OF ASCENDING AORTA WITHOUT RUPTURE (CMS-HCC): ICD-10-CM

## 2024-07-12 LAB
AORTIC VALVE PEAK VELOCITY: 1.03 M/S
AV PEAK GRADIENT: 4.2 MMHG
AVA (PEAK VEL): 3.45 CM2
EJECTION FRACTION APICAL 4 CHAMBER: 70
EJECTION FRACTION: 70 %
LEFT ATRIUM VOLUME AREA LENGTH INDEX BSA: 17 ML/M2
LEFT VENTRICLE INTERNAL DIMENSION DIASTOLE: 3.93 CM (ref 3.5–6)
LEFT VENTRICULAR OUTFLOW TRACT DIAMETER: 2.09 CM
MITRAL VALVE E/A RATIO: 0.61
MITRAL VALVE E/E' RATIO: 8.27
RIGHT VENTRICLE FREE WALL PEAK S': 12 CM/S
RIGHT VENTRICLE PEAK SYSTOLIC PRESSURE: 18.7 MMHG
TRICUSPID ANNULAR PLANE SYSTOLIC EXCURSION: 1.2 CM

## 2024-07-12 PROCEDURE — 93306 TTE W/DOPPLER COMPLETE: CPT | Performed by: INTERNAL MEDICINE

## 2024-07-12 PROCEDURE — 93306 TTE W/DOPPLER COMPLETE: CPT

## 2024-07-24 ENCOUNTER — OFFICE VISIT (OUTPATIENT)
Dept: PRIMARY CARE | Facility: CLINIC | Age: 79
End: 2024-07-24
Payer: MEDICARE

## 2024-07-24 VITALS — SYSTOLIC BLOOD PRESSURE: 124 MMHG | DIASTOLIC BLOOD PRESSURE: 72 MMHG

## 2024-07-24 DIAGNOSIS — H11.30 CONJUNCTIVAL HEMORRHAGE, UNSPECIFIED LATERALITY: ICD-10-CM

## 2024-07-24 DIAGNOSIS — E78.00 HYPERCHOLESTEREMIA: ICD-10-CM

## 2024-07-24 DIAGNOSIS — I77.1 SUBCLAVIAN ARTERY STENOSIS (CMS-HCC): Primary | ICD-10-CM

## 2024-07-24 PROCEDURE — 99213 OFFICE O/P EST LOW 20 MIN: CPT | Performed by: INTERNAL MEDICINE

## 2024-07-24 PROCEDURE — 1157F ADVNC CARE PLAN IN RCRD: CPT | Performed by: INTERNAL MEDICINE

## 2024-07-24 PROCEDURE — 1036F TOBACCO NON-USER: CPT | Performed by: INTERNAL MEDICINE

## 2024-07-24 PROCEDURE — 3074F SYST BP LT 130 MM HG: CPT | Performed by: INTERNAL MEDICINE

## 2024-07-24 PROCEDURE — 3078F DIAST BP <80 MM HG: CPT | Performed by: INTERNAL MEDICINE

## 2024-07-24 PROCEDURE — 1159F MED LIST DOCD IN RCRD: CPT | Performed by: INTERNAL MEDICINE

## 2024-07-24 PROCEDURE — 1160F RVW MEDS BY RX/DR IN RCRD: CPT | Performed by: INTERNAL MEDICINE

## 2024-07-24 RX ORDER — ATORVASTATIN CALCIUM 20 MG/1
20 TABLET, FILM COATED ORAL DAILY
Qty: 100 TABLET | Refills: 3 | Status: SHIPPED | OUTPATIENT
Start: 2024-07-24 | End: 2025-08-28

## 2024-07-24 RX ORDER — CLOPIDOGREL BISULFATE 75 MG/1
75 TABLET ORAL DAILY
Qty: 30 TABLET | Refills: 5 | Status: SHIPPED | OUTPATIENT
Start: 2024-07-24 | End: 2025-01-20
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
## 2024-07-24 NOTE — PATIENT INSTRUCTIONS
The area of concern was actually the right subclavian artery.  The carotid arteries look fine.  It was termed mild to moderate stenosis.  This was a total incidental finding   therefore   will have you see the vascular surgeon I agree that we will continue the Plavix as recommended in the emergency room also feel with this better control of cholesterol is warranted and we will start Lipitor at 20 mg daily recheck cholesterol and liver functions in about 3 months  I did place a referral to vascular surgeon  Dr Engel  [phone] may be able to schedule with Roney as well

## 2024-08-05 ENCOUNTER — PATIENT OUTREACH (OUTPATIENT)
Dept: CARE COORDINATION | Facility: CLINIC | Age: 79
End: 2024-08-05
Payer: MEDICARE

## 2024-08-23 ENCOUNTER — APPOINTMENT (OUTPATIENT)
Dept: CARDIAC SURGERY | Facility: CLINIC | Age: 79
End: 2024-08-23
Payer: MEDICARE

## 2024-08-27 PROBLEM — Z86.2 HISTORY OF ANEMIA: Status: ACTIVE | Noted: 2024-08-27

## 2024-08-27 PROBLEM — F41.8 SITUATIONAL ANXIETY: Status: ACTIVE | Noted: 2018-07-13

## 2024-08-27 PROBLEM — R06.83 SNORING: Status: ACTIVE | Noted: 2024-08-27
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
## 2024-08-27 PROBLEM — I71.21 ASCENDING AORTIC ANEURYSM (CMS-HCC): Status: ACTIVE | Noted: 2024-08-27

## 2024-08-27 PROBLEM — N60.19 FIBROCYSTIC BREAST CHANGES: Status: ACTIVE | Noted: 2024-08-27

## 2024-08-27 PROBLEM — Z86.39 HISTORY OF ELEVATED LIPIDS: Status: ACTIVE | Noted: 2024-08-27

## 2024-08-27 PROBLEM — R41.89: Status: ACTIVE | Noted: 2024-08-27

## 2024-08-27 PROBLEM — S61.459A DOG BITE OF HAND: Status: ACTIVE | Noted: 2024-08-27

## 2024-08-27 PROBLEM — I78.1 TELANGIECTASIA DISORDER: Status: ACTIVE | Noted: 2024-08-27

## 2024-08-27 PROBLEM — M25.559 ARTHRALGIA OF HIP: Status: ACTIVE | Noted: 2024-08-27

## 2024-08-27 PROBLEM — L98.9 SKIN LESION: Status: ACTIVE | Noted: 2024-08-27

## 2024-08-27 PROBLEM — I78.1 TELANGIECTASIA OF SKIN: Status: ACTIVE | Noted: 2024-08-27

## 2024-08-27 PROBLEM — H61.20 IMPACTED CERUMEN: Status: ACTIVE | Noted: 2024-08-27

## 2024-08-27 PROBLEM — N28.1 ACQUIRED CYSTIC KIDNEY DISEASE: Status: ACTIVE | Noted: 2024-08-27

## 2024-08-27 PROBLEM — K04.7 DENTAL ABSCESS: Status: ACTIVE | Noted: 2024-08-27

## 2024-08-27 RX ORDER — METOPROLOL SUCCINATE 50 MG/1
50 TABLET, EXTENDED RELEASE ORAL DAILY
COMMUNITY

## 2024-08-27 RX ORDER — PROCHLORPERAZINE MALEATE 10 MG
10 TABLET ORAL 3 TIMES DAILY PRN
COMMUNITY

## 2024-08-27 RX ORDER — IBANDRONATE SODIUM 150 MG/1
150 TABLET, FILM COATED ORAL
COMMUNITY

## 2024-08-27 RX ORDER — LORAZEPAM 0.5 MG/1
0.5 TABLET ORAL DAILY PRN
COMMUNITY

## 2024-08-27 RX ORDER — POTASSIUM CHLORIDE 20 MEQ/1
20 TABLET, EXTENDED RELEASE ORAL EVERY 12 HOURS
COMMUNITY

## 2024-08-27 RX ORDER — CALCIUM CARBONATE 600 MG
1 TABLET ORAL DAILY
COMMUNITY
Start: 2016-09-06

## 2024-08-27 RX ORDER — SENNOSIDES 8.6 MG/1
1 TABLET ORAL NIGHTLY PRN
COMMUNITY

## 2024-08-27 RX ORDER — BUTALBITAL, ACETAMINOPHEN AND CAFFEINE 300; 40; 50 MG/1; MG/1; MG/1
1 CAPSULE ORAL 3 TIMES DAILY PRN
COMMUNITY
Start: 2021-11-09

## 2024-08-27 RX ORDER — DESONIDE 0.5 MG/G
CREAM TOPICAL
COMMUNITY
Start: 2021-07-30

## 2024-08-28 ENCOUNTER — OFFICE VISIT (OUTPATIENT)
Dept: VASCULAR SURGERY | Facility: HOSPITAL | Age: 79
End: 2024-08-28
Payer: MEDICARE

## 2024-08-28 VITALS
OXYGEN SATURATION: 95 % | SYSTOLIC BLOOD PRESSURE: 130 MMHG | DIASTOLIC BLOOD PRESSURE: 80 MMHG | BODY MASS INDEX: 27.59 KG/M2 | HEIGHT: 61 IN | HEART RATE: 77 BPM

## 2024-08-28 DIAGNOSIS — I77.1 SUBCLAVIAN ARTERY STENOSIS (CMS-HCC): ICD-10-CM

## 2024-08-28 PROCEDURE — 99204 OFFICE O/P NEW MOD 45 MIN: CPT | Performed by: SURGERY

## 2024-08-28 PROCEDURE — 1036F TOBACCO NON-USER: CPT | Performed by: SURGERY

## 2024-08-28 PROCEDURE — 1159F MED LIST DOCD IN RCRD: CPT | Performed by: SURGERY

## 2024-08-28 PROCEDURE — 1126F AMNT PAIN NOTED NONE PRSNT: CPT | Performed by: SURGERY

## 2024-08-28 PROCEDURE — 99214 OFFICE O/P EST MOD 30 MIN: CPT | Performed by: SURGERY

## 2024-08-28 PROCEDURE — 3075F SYST BP GE 130 - 139MM HG: CPT | Performed by: SURGERY

## 2024-08-28 PROCEDURE — 3079F DIAST BP 80-89 MM HG: CPT | Performed by: SURGERY

## 2024-08-28 PROCEDURE — 1157F ADVNC CARE PLAN IN RCRD: CPT | Performed by: SURGERY

## 2024-08-28 ASSESSMENT — ENCOUNTER SYMPTOMS
ABDOMINAL PAIN: 0
WOUND: 0
SHORTNESS OF BREATH: 0
ABDOMINAL DISTENTION: 0
UNEXPECTED WEIGHT CHANGE: 0
WEAKNESS: 0
NUMBNESS: 0

## 2024-08-28 ASSESSMENT — PAIN SCALES - GENERAL: PAINLEVEL: 0-NO PAIN
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
## 2024-09-04 ENCOUNTER — OFFICE VISIT (OUTPATIENT)
Dept: CARDIAC SURGERY | Facility: HOSPITAL | Age: 79
End: 2024-09-04
Payer: MEDICARE

## 2024-09-04 VITALS
BODY MASS INDEX: 27.6 KG/M2 | SYSTOLIC BLOOD PRESSURE: 131 MMHG | HEIGHT: 62 IN | HEART RATE: 79 BPM | DIASTOLIC BLOOD PRESSURE: 80 MMHG | WEIGHT: 150 LBS | OXYGEN SATURATION: 97 %

## 2024-09-04 DIAGNOSIS — I71.21 ASCENDING AORTIC ANEURYSM, UNSPECIFIED WHETHER RUPTURED (CMS-HCC): Primary | ICD-10-CM

## 2024-09-04 PROCEDURE — 1157F ADVNC CARE PLAN IN RCRD: CPT

## 2024-09-04 PROCEDURE — 99214 OFFICE O/P EST MOD 30 MIN: CPT

## 2024-09-04 PROCEDURE — 1159F MED LIST DOCD IN RCRD: CPT

## 2024-09-04 PROCEDURE — 3079F DIAST BP 80-89 MM HG: CPT

## 2024-09-04 PROCEDURE — 1126F AMNT PAIN NOTED NONE PRSNT: CPT

## 2024-09-04 PROCEDURE — 3075F SYST BP GE 130 - 139MM HG: CPT

## 2024-09-04 ASSESSMENT — PAIN SCALES - GENERAL: PAINLEVEL: 0-NO PAIN

## 2024-09-05 ASSESSMENT — ENCOUNTER SYMPTOMS
WEIGHT LOSS: 0
WEIGHT GAIN: 0
NEAR-SYNCOPE: 0
WHEEZING: 0
COUGH: 0
CHILLS: 0
SHORTNESS OF BREATH: 0
DECREASED APPETITE: 0
IRREGULAR HEARTBEAT: 0
FEVER: 0
ORTHOPNEA: 0
SLEEP DISTURBANCES DUE TO BREATHING: 0

## 2024-09-05 NOTE — PROGRESS NOTES
Subjective   Jolanta Pickering is a 79 y.o. female.    Chief Complaint:  FUV (Review CT & ECHO)    HPI  Mrs. Pickering presents today for follow-up visit with CT scan and echo.  She was most recently seen by Dr. Simon for surveillance of her a ascending aorta and aortic valve.  She states that she is doing well overall and does not have any complaints today.  She denies chest pain, shortness of breath, dyspnea on exertion, palpitations, and syncopal episodes.  There is a trace amount of edema in her left lower ankle.    Review of Systems   Constitutional: Negative for chills, decreased appetite, fever, weight gain and weight loss.   Cardiovascular:  Negative for irregular heartbeat, near-syncope and orthopnea.   Respiratory:  Negative for cough, shortness of breath, sleep disturbances due to breathing and wheezing.        Objective   Cardiovascular:      Normal rate. Regular rhythm. Normal S1. Normal S2.    Psychiatric:         Attention and Perception: Attention normal.         Mood and Affect: Mood normal.         Speech: Speech normal.         Behavior: Behavior normal.         Thought Content: Thought content normal.         Judgment: Judgment normal.         Assessment/Plan   In summary, Mrs. Pickering is doing well.  She is not experiencing any symptoms and leads a pretty active lifestyle.  Her CT T scan shows a stable 4.2 cm aneurysm that measures the same as it did on her CT done 2 years ago.  Her left ventricular function is normal estimated at 70%.  There is trace aortic valve regurgitation with a peak gradient of 4.2 mmHg.  I have personally reviewed the studies and provided patient with my interpretation.  We will follow-up in 2 years with a new echo and CT scan.    Plan:  Follow-up in 2 years with new echo and CT scan.  Office visit in 2 years once echo and CT are complete.  Call office with any concerns, issues, and/or questions    Time: 30 minutes

## 2024-09-16 DIAGNOSIS — F51.01 PRIMARY INSOMNIA: ICD-10-CM

## 2024-09-16 DIAGNOSIS — F33.0 MILD EPISODE OF RECURRENT MAJOR DEPRESSIVE DISORDER (CMS-HCC): ICD-10-CM

## 2024-09-16 DIAGNOSIS — F33.9 RECURRENT MAJOR DEPRESSIVE DISORDER, REMISSION STATUS UNSPECIFIED (CMS-HCC): ICD-10-CM

## 2024-09-16 RX ORDER — TRAZODONE HYDROCHLORIDE 100 MG/1
TABLET ORAL
Qty: 180 TABLET | Refills: 0 | Status: SHIPPED | OUTPATIENT
Start: 2024-09-16

## 2024-09-16 RX ORDER — BUPROPION HYDROCHLORIDE 150 MG/1
150 TABLET ORAL DAILY
Qty: 90 TABLET | Refills: 0 | Status: SHIPPED | OUTPATIENT
Start: 2024-09-16

## 2024-09-16 RX ORDER — CITALOPRAM 20 MG/1
20 TABLET, FILM COATED ORAL DAILY
Qty: 90 TABLET | Refills: 0 | Status: SHIPPED | OUTPATIENT
Start: 2024-09-16

## 2024-10-24 DIAGNOSIS — T75.3XXA SEA SICKNESS, INITIAL ENCOUNTER: Primary | ICD-10-CM

## 2024-10-24 DIAGNOSIS — L70.9 ADULT ACNE: ICD-10-CM

## 2024-10-24 RX ORDER — TRETINOIN 0.25 MG/G
CREAM TOPICAL NIGHTLY
Qty: 20 G | Refills: 0 | Status: SHIPPED | OUTPATIENT
Start: 2024-10-24

## 2024-10-24 RX ORDER — SCOLOPAMINE TRANSDERMAL SYSTEM 1 MG/1
1 PATCH, EXTENDED RELEASE TRANSDERMAL
Qty: 6 PATCH | Refills: 2 | Status: SHIPPED | OUTPATIENT
Start: 2024-10-24 | End: 2024-12-23

## 2024-11-05 DIAGNOSIS — G43.119 INTRACTABLE MIGRAINE WITH AURA WITHOUT STATUS MIGRAINOSUS: ICD-10-CM

## 2024-11-06 RX ORDER — VERAPAMIL HYDROCHLORIDE 100 MG/1
100 CAPSULE, EXTENDED RELEASE ORAL DAILY
Qty: 90 CAPSULE | Refills: 3 | Status: SHIPPED | OUTPATIENT
Start: 2024-11-06

## 2024-11-19 ENCOUNTER — HOSPITAL ENCOUNTER (OUTPATIENT)
Dept: RADIOLOGY | Facility: CLINIC | Age: 79
Discharge: HOME | End: 2024-11-19
Payer: MEDICARE

## 2024-11-19 DIAGNOSIS — Z12.31 ENCOUNTER FOR SCREENING MAMMOGRAM FOR MALIGNANT NEOPLASM OF BREAST: ICD-10-CM

## 2024-11-19 PROCEDURE — 77067 SCR MAMMO BI INCL CAD: CPT

## 2024-11-19 PROCEDURE — 77067 SCR MAMMO BI INCL CAD: CPT | Performed by: STUDENT IN AN ORGANIZED HEALTH CARE EDUCATION/TRAINING PROGRAM

## 2024-11-19 PROCEDURE — 77063 BREAST TOMOSYNTHESIS BI: CPT | Performed by: STUDENT IN AN ORGANIZED HEALTH CARE EDUCATION/TRAINING PROGRAM

## 2024-12-09 DIAGNOSIS — F33.9 RECURRENT MAJOR DEPRESSIVE DISORDER, REMISSION STATUS UNSPECIFIED (CMS-HCC): ICD-10-CM

## 2024-12-09 DIAGNOSIS — F33.0 MILD EPISODE OF RECURRENT MAJOR DEPRESSIVE DISORDER (CMS-HCC): ICD-10-CM

## 2024-12-09 RX ORDER — CITALOPRAM 20 MG/1
20 TABLET, FILM COATED ORAL DAILY
Qty: 90 TABLET | Refills: 0 | Status: SHIPPED | OUTPATIENT
Start: 2024-12-09

## 2024-12-09 RX ORDER — BUPROPION HYDROCHLORIDE 150 MG/1
150 TABLET ORAL DAILY
Qty: 90 TABLET | Refills: 0 | Status: SHIPPED | OUTPATIENT
Start: 2024-12-09

## 2025-01-21 DIAGNOSIS — Z00.00 PHYSICAL EXAM: ICD-10-CM

## 2025-01-23 ENCOUNTER — LAB (OUTPATIENT)
Dept: LAB | Facility: LAB | Age: 80
End: 2025-01-23
Payer: MEDICARE

## 2025-01-23 DIAGNOSIS — Z00.00 PHYSICAL EXAM: ICD-10-CM

## 2025-01-23 LAB
25(OH)D3 SERPL-MCNC: 47 NG/ML (ref 30–100)
ALBUMIN SERPL BCP-MCNC: 3.8 G/DL (ref 3.4–5)
ALP SERPL-CCNC: 34 U/L (ref 33–136)
ALT SERPL W P-5'-P-CCNC: 11 U/L (ref 7–45)
ANION GAP SERPL CALC-SCNC: 9 MMOL/L (ref 10–20)
APPEARANCE UR: CLEAR
AST SERPL W P-5'-P-CCNC: 13 U/L (ref 9–39)
BASOPHILS # BLD AUTO: 0.03 X10*3/UL (ref 0–0.1)
BASOPHILS NFR BLD AUTO: 0.7 %
BILIRUB SERPL-MCNC: 0.6 MG/DL (ref 0–1.2)
BILIRUB UR STRIP.AUTO-MCNC: NEGATIVE MG/DL
BUN SERPL-MCNC: 12 MG/DL (ref 6–23)
CALCIUM SERPL-MCNC: 8.6 MG/DL (ref 8.6–10.3)
CHLORIDE SERPL-SCNC: 105 MMOL/L (ref 98–107)
CHOLEST SERPL-MCNC: 197 MG/DL (ref 0–199)
CHOLESTEROL/HDL RATIO: 3.9
CO2 SERPL-SCNC: 30 MMOL/L (ref 21–32)
COLOR UR: YELLOW
CREAT SERPL-MCNC: 0.9 MG/DL (ref 0.5–1.05)
CRP SERPL HS-MCNC: 0.8 MG/L
EGFRCR SERPLBLD CKD-EPI 2021: 66 ML/MIN/1.73M*2
EOSINOPHIL # BLD AUTO: 0.07 X10*3/UL (ref 0–0.4)
EOSINOPHIL NFR BLD AUTO: 1.6 %
ERYTHROCYTE [DISTWIDTH] IN BLOOD BY AUTOMATED COUNT: 12.5 % (ref 11.5–14.5)
EST. AVERAGE GLUCOSE BLD GHB EST-MCNC: 103 MG/DL
GLUCOSE SERPL-MCNC: 94 MG/DL (ref 74–99)
GLUCOSE UR STRIP.AUTO-MCNC: NORMAL MG/DL
HBA1C MFR BLD: 5.2 %
HCT VFR BLD AUTO: 37.7 % (ref 36–46)
HDLC SERPL-MCNC: 50.9 MG/DL
HGB BLD-MCNC: 12.2 G/DL (ref 12–16)
HOLD SPECIMEN: NORMAL
IMM GRANULOCYTES # BLD AUTO: 0.01 X10*3/UL (ref 0–0.5)
IMM GRANULOCYTES NFR BLD AUTO: 0.2 % (ref 0–0.9)
KETONES UR STRIP.AUTO-MCNC: NEGATIVE MG/DL
LDLC SERPL CALC-MCNC: 127 MG/DL
LEUKOCYTE ESTERASE UR QL STRIP.AUTO: ABNORMAL
LYMPHOCYTES # BLD AUTO: 1.27 X10*3/UL (ref 0.8–3)
LYMPHOCYTES NFR BLD AUTO: 29.6 %
MCH RBC QN AUTO: 28.5 PG (ref 26–34)
MCHC RBC AUTO-ENTMCNC: 32.4 G/DL (ref 32–36)
MCV RBC AUTO: 88 FL (ref 80–100)
MONOCYTES # BLD AUTO: 0.46 X10*3/UL (ref 0.05–0.8)
MONOCYTES NFR BLD AUTO: 10.7 %
MUCOUS THREADS #/AREA URNS AUTO: NORMAL /LPF
NEUTROPHILS # BLD AUTO: 2.45 X10*3/UL (ref 1.6–5.5)
NEUTROPHILS NFR BLD AUTO: 57.2 %
NITRITE UR QL STRIP.AUTO: NEGATIVE
NON HDL CHOLESTEROL: 146 MG/DL (ref 0–149)
NRBC BLD-RTO: 0 /100 WBCS (ref 0–0)
PH UR STRIP.AUTO: 7.5 [PH]
PLATELET # BLD AUTO: 225 X10*3/UL (ref 150–450)
POTASSIUM SERPL-SCNC: 4.2 MMOL/L (ref 3.5–5.3)
PROT SERPL-MCNC: 6.3 G/DL (ref 6.4–8.2)
PROT UR STRIP.AUTO-MCNC: NEGATIVE MG/DL
RBC # BLD AUTO: 4.28 X10*6/UL (ref 4–5.2)
RBC # UR STRIP.AUTO: NEGATIVE /UL
RBC #/AREA URNS AUTO: NORMAL /HPF
SODIUM SERPL-SCNC: 140 MMOL/L (ref 136–145)
SP GR UR STRIP.AUTO: 1.01
SQUAMOUS #/AREA URNS AUTO: NORMAL /HPF
TRIGL SERPL-MCNC: 98 MG/DL (ref 0–149)
TSH SERPL-ACNC: 2.29 MIU/L (ref 0.44–3.98)
UROBILINOGEN UR STRIP.AUTO-MCNC: NORMAL MG/DL
VLDL: 20 MG/DL (ref 0–40)
WBC # BLD AUTO: 4.3 X10*3/UL (ref 4.4–11.3)
WBC #/AREA URNS AUTO: NORMAL /HPF

## 2025-01-23 PROCEDURE — 80061 LIPID PANEL: CPT

## 2025-01-23 PROCEDURE — 87086 URINE CULTURE/COLONY COUNT: CPT

## 2025-01-23 PROCEDURE — 83036 HEMOGLOBIN GLYCOSYLATED A1C: CPT

## 2025-01-23 PROCEDURE — 82306 VITAMIN D 25 HYDROXY: CPT

## 2025-01-23 PROCEDURE — 86141 C-REACTIVE PROTEIN HS: CPT

## 2025-01-23 PROCEDURE — 84443 ASSAY THYROID STIM HORMONE: CPT

## 2025-01-23 PROCEDURE — 85025 COMPLETE CBC W/AUTO DIFF WBC: CPT

## 2025-01-23 PROCEDURE — 81001 URINALYSIS AUTO W/SCOPE: CPT

## 2025-01-23 PROCEDURE — 80053 COMPREHEN METABOLIC PANEL: CPT

## 2025-01-24 LAB — BACTERIA UR CULT: NORMAL
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
## 2025-02-03 ENCOUNTER — OFFICE VISIT (OUTPATIENT)
Dept: PRIMARY CARE | Facility: CLINIC | Age: 80
End: 2025-02-03
Payer: MEDICARE

## 2025-02-03 ENCOUNTER — APPOINTMENT (OUTPATIENT)
Dept: PRIMARY CARE | Facility: CLINIC | Age: 80
End: 2025-02-03
Payer: MEDICARE

## 2025-02-03 VITALS
WEIGHT: 157 LBS | HEIGHT: 61 IN | OXYGEN SATURATION: 98 % | SYSTOLIC BLOOD PRESSURE: 136 MMHG | DIASTOLIC BLOOD PRESSURE: 80 MMHG | BODY MASS INDEX: 29.64 KG/M2 | HEART RATE: 68 BPM

## 2025-02-03 VITALS
DIASTOLIC BLOOD PRESSURE: 84 MMHG | BODY MASS INDEX: 29.64 KG/M2 | HEART RATE: 68 BPM | WEIGHT: 157 LBS | SYSTOLIC BLOOD PRESSURE: 128 MMHG | OXYGEN SATURATION: 98 % | HEIGHT: 61 IN

## 2025-02-03 DIAGNOSIS — Z86.39 HISTORY OF ELEVATED LIPIDS: ICD-10-CM

## 2025-02-03 DIAGNOSIS — I71.21 ANEURYSM OF ASCENDING AORTA WITHOUT RUPTURE (CMS-HCC): Primary | ICD-10-CM

## 2025-02-03 DIAGNOSIS — E78.00 HYPERCHOLESTEREMIA: ICD-10-CM

## 2025-02-03 DIAGNOSIS — Z85.3 PERSONAL HISTORY OF BREAST CANCER: ICD-10-CM

## 2025-02-03 DIAGNOSIS — Z00.00 ROUTINE HEALTH MAINTENANCE: ICD-10-CM

## 2025-02-03 DIAGNOSIS — Z12.31 SCREENING MAMMOGRAM FOR BREAST CANCER: ICD-10-CM

## 2025-02-03 DIAGNOSIS — I10 HYPERTENSION, UNSPECIFIED TYPE: ICD-10-CM

## 2025-02-03 PROCEDURE — UHSPHYS PR UH SELECT PHYSICAL: Performed by: INTERNAL MEDICINE

## 2025-02-03 PROCEDURE — 1159F MED LIST DOCD IN RCRD: CPT | Performed by: INTERNAL MEDICINE

## 2025-02-03 PROCEDURE — 93000 ELECTROCARDIOGRAM COMPLETE: CPT | Performed by: INTERNAL MEDICINE

## 2025-02-03 PROCEDURE — 1157F ADVNC CARE PLAN IN RCRD: CPT | Performed by: INTERNAL MEDICINE

## 2025-02-03 PROCEDURE — 1160F RVW MEDS BY RX/DR IN RCRD: CPT | Performed by: INTERNAL MEDICINE

## 2025-02-03 PROCEDURE — 3079F DIAST BP 80-89 MM HG: CPT | Performed by: INTERNAL MEDICINE

## 2025-02-03 PROCEDURE — G0439 PPPS, SUBSEQ VISIT: HCPCS | Performed by: INTERNAL MEDICINE

## 2025-02-03 PROCEDURE — 3074F SYST BP LT 130 MM HG: CPT | Performed by: INTERNAL MEDICINE

## 2025-02-03 PROCEDURE — 3075F SYST BP GE 130 - 139MM HG: CPT | Performed by: INTERNAL MEDICINE

## 2025-02-03 PROCEDURE — 1036F TOBACCO NON-USER: CPT | Performed by: INTERNAL MEDICINE

## 2025-02-03 RX ORDER — ROSUVASTATIN CALCIUM 10 MG/1
10 TABLET, COATED ORAL DAILY
Qty: 100 TABLET | Refills: 3 | Status: SHIPPED | OUTPATIENT
Start: 2025-02-03 | End: 2026-03-10

## 2025-02-03 RX ORDER — CLOPIDOGREL BISULFATE 75 MG/1
1 TABLET ORAL
COMMUNITY
Start: 2025-01-07 | End: 2025-02-06

## 2025-02-03 ASSESSMENT — PATIENT HEALTH QUESTIONNAIRE - PHQ9
2. FEELING DOWN, DEPRESSED OR HOPELESS: NOT AT ALL
SUM OF ALL RESPONSES TO PHQ9 QUESTIONS 1 AND 2: 0
1. LITTLE INTEREST OR PLEASURE IN DOING THINGS: NOT AT ALL

## 2025-02-03 ASSESSMENT — ACTIVITIES OF DAILY LIVING (ADL)
TAKING_MEDICATION: INDEPENDENT
MANAGING_FINANCES: INDEPENDENT
GROCERY_SHOPPING: INDEPENDENT
DOING_HOUSEWORK: INDEPENDENT

## 2025-02-03 ASSESSMENT — ENCOUNTER SYMPTOMS
DEPRESSION: 0
LOSS OF SENSATION IN FEET: 0
OCCASIONAL FEELINGS OF UNSTEADINESS: 0
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
## 2025-02-03 NOTE — PATIENT INSTRUCTIONS
It was good to see you.  You are doing a good job with your overall health care.   Your cholesterol remains slightly high and I really would like to see your LDL value lower   goal is definitely to have this less than 100 closer to the range of 70   okay as we discussed we are going to start Crestor or rosuvastatin at 10 mg start with just 3 days weekly but after a month if you are able to tolerate we will increase hopefully to 1 daily.  Lets plan to recheck in 3 -45 months   From the standpoint of chronic constipation I recommend you take fiber supplement like Metamucil Citrucel or Benefiber daily   sometimes this can help with cholesterol as well   you can take MiraLAX on a as needed basis as well  All other blood work is acceptable  I do agree that you would benefit from weight loss.  I do feel a good weight for you would be around 145 pounds.  There really are no easy answers diet exercise strongly urged could consider nutritional consult  with Elena   You are up-to-date with immunizations  Your last bone density performed and 2023 looked very stable I think we can wait until 2026 to repeat this  Mammogram will be due in November  Routine regular exercise is recommended. American [employer] Association recommended 100-120 mins weekly of aerobic exercise (exercise that increases your heart rate). In addition you should add resistance training (lifting weights/etc.).      General recommendations  I encourage you to stay active and healthy, and to follow these healthy habits:     Try to increase your intake of fish such as salmon and tuna and try to get 2 to 3 servings of fish per week.  Increase your intake of plant-based protein listed here:    Unprocessed nuts, walnuts, or almonds, Nuts and Seeds.      Green vegetables such as Broccoli, Peas, Greens, Spinach    Beans, Chickpeas, & Lentils    Other sources include Potatoes, Quinoa, Seaweed, Soymilk, Tempeh, and Tofu.  Increase food s higher in flavonoids found in  black tea, green tea, apples, nuts, citrus fruit, berries, and dark chocolate.  You should avoid fried foods, and sugary or starchy foods and sugary drinks, and avoid saturated fats.    Try not to dine at restaurants frequently and avoid fast food restaurants.      Try to get restful sleep approximately 7-9 hours every night.  Work towards getting 30 minutes of  moderate intensity exercise 4 to 5 days per week.  You should also try to exercise at least one hour per day with light walking.

## 2025-02-06 DIAGNOSIS — I77.1 SUBCLAVIAN ARTERIAL STENOSIS: Primary | ICD-10-CM

## 2025-02-06 RX ORDER — CLOPIDOGREL BISULFATE 75 MG/1
TABLET ORAL
Qty: 30 TABLET | Refills: 0 | Status: SHIPPED | OUTPATIENT
Start: 2025-02-06

## 2025-02-09 DIAGNOSIS — F51.01 PRIMARY INSOMNIA: ICD-10-CM

## 2025-02-09 RX ORDER — TRAZODONE HYDROCHLORIDE 100 MG/1
TABLET ORAL
Qty: 180 TABLET | Refills: 0 | Status: SHIPPED | OUTPATIENT
Start: 2025-02-09

## 2025-02-11 ENCOUNTER — APPOINTMENT (OUTPATIENT)
Dept: RADIOLOGY | Facility: HOSPITAL | Age: 80
End: 2025-02-11
Payer: MEDICARE

## 2025-02-11 ENCOUNTER — HOSPITAL ENCOUNTER (EMERGENCY)
Facility: HOSPITAL | Age: 80
Discharge: HOME | End: 2025-02-11
Payer: MEDICARE

## 2025-02-11 ENCOUNTER — APPOINTMENT (OUTPATIENT)
Dept: PRIMARY CARE | Facility: CLINIC | Age: 80
End: 2025-02-11
Payer: MEDICARE

## 2025-02-11 VITALS
TEMPERATURE: 98.7 F | OXYGEN SATURATION: 98 % | WEIGHT: 155 LBS | HEART RATE: 88 BPM | DIASTOLIC BLOOD PRESSURE: 86 MMHG | BODY MASS INDEX: 29.29 KG/M2 | RESPIRATION RATE: 20 BRPM | SYSTOLIC BLOOD PRESSURE: 131 MMHG

## 2025-02-11 DIAGNOSIS — Z00.00 ROUTINE HEALTH MAINTENANCE: ICD-10-CM

## 2025-02-11 DIAGNOSIS — S62.001A CLOSED NONDISPLACED FRACTURE OF SCAPHOID OF RIGHT WRIST, UNSPECIFIED PORTION OF SCAPHOID, INITIAL ENCOUNTER: Primary | ICD-10-CM

## 2025-02-11 PROCEDURE — 99283 EMERGENCY DEPT VISIT LOW MDM: CPT

## 2025-02-11 PROCEDURE — 73110 X-RAY EXAM OF WRIST: CPT | Mod: RT

## 2025-02-11 ASSESSMENT — COLUMBIA-SUICIDE SEVERITY RATING SCALE - C-SSRS
6. HAVE YOU EVER DONE ANYTHING, STARTED TO DO ANYTHING, OR PREPARED TO DO ANYTHING TO END YOUR LIFE?: NO
1. IN THE PAST MONTH, HAVE YOU WISHED YOU WERE DEAD OR WISHED YOU COULD GO TO SLEEP AND NOT WAKE UP?: NO
2. HAVE YOU ACTUALLY HAD ANY THOUGHTS OF KILLING YOURSELF?: NO

## 2025-02-11 ASSESSMENT — PAIN DESCRIPTION - DESCRIPTORS: DESCRIPTORS: SORE

## 2025-02-11 ASSESSMENT — PAIN DESCRIPTION - ORIENTATION: ORIENTATION: RIGHT

## 2025-02-11 ASSESSMENT — PAIN SCALES - GENERAL: PAINLEVEL_OUTOF10: 2

## 2025-02-11 ASSESSMENT — PAIN DESCRIPTION - LOCATION: LOCATION: WRIST

## 2025-02-11 ASSESSMENT — PAIN - FUNCTIONAL ASSESSMENT: PAIN_FUNCTIONAL_ASSESSMENT: 0-10

## 2025-02-11 NOTE — PROGRESS NOTES
Reason for Nutrition Visit:  It was a pleasure meeting Ms. Pickering today to discuss diet and nutrition as part of the  [facility] program.    Medication Documentation Review Audit       Reviewed by Lili Rea MD (Physician Assistant) on 02/03/25 at 0957      Medication Order Taking? Sig Documenting Provider Last Dose Status     Discontinued 02/03/25 0919   buPROPion XL (Wellbutrin XL) 150 mg 24 hr tablet [id number] Yes TAKE ONE TABLET BY MOUTH EVERY DAY Lili Rea MD  Active     Discontinued 02/03/25 0835   calcium carbonate 600 mg calcium (1,500 mg) tablet [id number] Yes Take 1 tablet (1,500 mg) by mouth once daily. Historical Provider, MD  Active   citalopram (CeleXA) 20 mg tablet [id number] Yes TAKE ONE TABLET BY MOUTH EVERY DAY Lili Rea MD  Active   clopidogrel (Plavix) 75 mg tablet [id number] Yes Take 1 tablet (75 mg) by mouth early in the morning.. Historical Provider, MD  Active     Discontinued 02/03/25 0837     Discontinued 02/03/25 0836     Discontinued 02/03/25 0836     Discontinued 02/03/25 0836     Discontinued 02/03/25 0836     Discontinued 02/03/25 0836     Discontinued 02/03/25 0836   prochlorperazine (Compazine) 10 mg tablet [id number] Yes Insert 1 tablet (10 mg) into the rectum 3 times a day as needed for nausea or vomiting. Historical Provider, MD  Active   rosuvastatin (Crestor) 10 mg tablet [id number]  Take 1 tablet (10 mg) by mouth once daily. Lili Rea MD  Active     Discontinued 02/03/25 0836   traZODone (Desyrel) 100 mg tablet [id number] Yes take one to two tablets by mouth once daily at bedtime as needed for sleep Lili Rea MD  Active   tretinoin (Retin-A) 0.025 % cream [id number] Yes apply topically once daily at bedtime Lili Rea MD  Active   verapamil ER (Veralan PM) 100 mg 24 hr capsule [id number] Yes TAKE 1 CAPSULE BY MOUTH DAILY Lili Rea MD  Active                     Past Medical Hx:  Patient Active  Problem List   Diagnosis    Esophageal reflux    Fatigue    Hypertension    Insomnia    Migraine headache    Osteopenia    Personal history of breast cancer    Recurrent major depressive disorder (CMS-HCC)    Renal cell carcinoma (Multi)    Sensorineural hearing loss, bilateral    Acquired cystic kidney disease    Arthralgia of hip    Ascending aortic aneurysm (CMS-HCC)    Dental abscess    Difficult airway    Dog bite of hand    Fibrocystic breast changes    History of anemia    Impacted cerumen    Nuclear senile cataract    Situational anxiety    Telangiectasia of skin    Skin lesion    Telangiectasia disorder    Snoring    Unawareness    Hypercholesteremia        Weight change:  Her current weight is 154.2lbs on her scale at home this morning.  She would like her weight to be closer to 145lbs. She has gained 10lbs in the past year.  She was at 144-145lbs two years ago.  She gained weight after going on 2 cruises.    Significant Weight Change: No    Lab Results   Component Value Date    HGBA1C 5.2 01/23/2025    CHOL 197 01/23/2025    LDLCALC 127 (H) 01/23/2025    TRIG 98 01/23/2025    HDL 50.9 01/23/2025    LDLF 108 (H) 01/31/2023        Food and Nutrition Hx:  She does not like eggs.      She reports eating 2 meals a day.  She usually eats fruit for breakfast. She usually skips lunch and will have an afternoon snack.  Her snack could be a piece of chocolate, grapes, sometimes potato chips, popcorn.  In the past three weeks she has stopped eating by 8pm and will not eat her first meal the next day until 10/10:30am.  She likes this new schedule.      She is eating out for dinner 4-5 times a week.  She is trying to only eat 1/2 of her portions when she eats out.  She does not eat fish.  She tries to stay away from fried foods.  She enjoys potatoes.      24 Hour Food Recall:  Breakfast: Cool Whip Light, sugar-free pistachio pudding, crushed pineapple, mandarin oranges; 2 cookies  Lunch: 1/2 salad ([employer])-mozzarella cheese, dried cranberries, greens, Italian dressing  Dinner: second 1/2 of salad from lunch; 8 squares of chocolate      Beverages: Ice beverages-1 1/2 bottles a day; water-12oz a day; coffee-2 cups a day (black); orange juice-on occasion; alcohol-none    Allergies: None  Intolerance: None    GI Symptoms : constipation Frequency: frequent She has a bowel movement every 10 days.  She says it is a very large bowel movement.  She started taking Metamucil gummies and she has had a few more small bowel movements since starting that.  She takes 3 gummies every 2-3 days.  Three gummies provides 5g fiber.      Exercise: just started in the past two weeks.  She is walking every morning, 20 minutes in her building (she gets 2175-6422 steps with this walk).  She hates exercising and is not interested in any form of strength training.      Sleep duration/quality : 8 hours a night    Supplements: Calcium/magnesium gummies, Biotin  daily    Cravings: Sweet-cookies, cake  Energy Levels: High      Estimated Nutrient Needs:    Calories for Weight Maintenance: 3913-0007 ([place] Jeor x 1.2-1.3 activity factor)  Calories for Weight Loss: 1200  Protein Needs: 90g/day (0.6g/lb DBW, 145lbs)    Nutrition Diagnosis:    Diagnosis Statement 1:  Diagnosis Status: New  Diagnosis : Inadequate fiber intake  related to food and nutrition related knowledge deficit concerning desirable quantities of fiber as evidenced by  food recall showing she is not meeting the recommended 25g fiber per day, high intake of processed foods, low intake of fruits, vegetables, beans, whole grains    Diagnosis Statement 2:  Diagnosis Status: New  Diagnosis : Inadequate protein intake  related to  food- and nutrition-related knowledge deficit regarding appropriate protein intake  as evidenced by  food recall showing she is not meeting the recommended 90g protein per day    Diagnosis Statement 3:   Diagnosis Status: New  Diagnosis : Altered GI  function  related to  low fiber diet, poor diet quality  as evidenced by  chronic constipation, only having one bowel movement every 10 days    Nutrition Interventions:  Increased Fiber Diet and Increased Protein Diet      Nutrition Goals:  Nutrition Goals : Consistent meal/snack pattern  Consume prescribed supplements  Improve GI function  Reduce Kcal Intake  Weight Loss  Adequate protein intake  Adequate fiber intake    Nutrition Recommendations:    1) To promote weight loss, try to stick to a 1200 calorie a day meal plan.     2) To help create well balanced meals while being mindful of portion sizes, use the plate model for portioning out your meals.  Fill 1/2 of your plate with non-starchy vegetables, 1/4 of your plate with lean protein (~4oz per meal), and 1/4 of your plate with complex carbohydrates/whole grains.  Each meal should contain the following 3 components: protein + fiber + healthy fats.    3) Aim for 25g fiber daily.  One way to help you reach this goal is to include non-starchy vegetables with both lunch and dinner (at least 1-2 cups).  Be sure to include a wide variety of colorful vegetables throughout the week.  Also, be sure to use 100% whole wheat/whole grain breads/pastas, brown/wild rice, quinoa, oats, beans, lentils, starchy vegetables-potatoes, sweet potatoes, corn, peas, winter squash and limit/avoid white, processed carbohydrates (white bread, white pasta, white rice, etc).    4) Choose 3 out of 5 of the following daily to help you reach your daily fiber goals:  -1 cup berries (4-5g fiber)  -1/2 cup beans (7g fiber)  -1/4 cup nuts (5g fiber)  -1/3 avocado (4g fiber)  -3 cups dark leafy greens (5g fiber)    5) Ensure you are getting adequate amounts of protein consistently throughout the day.  Your daily protein goal is 90g.  Aim for 25-30g per meal and include 5-10g protein at snacks.    6) You could try using a pre-made protein drink for lunches instead of skipping this meal.   Recommended brands:  -Iconic grass fed whey protein drink  -OWYN plant protein drink    7) Recommended supplements:  -Nordic Naturals Pro Omega, 2 capsules per day  -Pure Encapsulations magnesium citrate, 3-4 capsules at bedtime  -Atrantil, 2 capsules with each meal  -Planetary Herbals Triphala, 2 capsules twice a day.  Once your bowel movements become more regular you can take this as needed.     8) Increase your water intake to at least 60oz a day.    9) Work up to 8000 steps a day.    Educational Handouts: Use a Plate to Plan Your Meals

## 2025-02-11 NOTE — ED PROVIDER NOTES
HPI   Chief Complaint   Patient presents with   • Wrist Injury       Ms. Pickering is a 79-year-old female with a past medical history of breast cancer, anemia, hyperlipidemia, anxiety/depression who presents emergency department stating earlier this morning she was walking her dog outside when she slipped, fell, and caught her fall with her outstretched right hand.  Patient injured the distal radius of her wrist.  She has some mild pain and bruising to the area and presents to the ER to rule out fracture.  Patient did not hit her head.  She is not on blood thinners.  She is left-hand dominant.  No paresthesias of the hand.  No weakness specifically of the right thumb index or third finger.      History provided by:  Patient          Patient History   Past Medical History:   Diagnosis Date   • Cyst of kidney, acquired     Cyst, kidney, acquired   • Malignant neoplasm of upper-outer quadrant of unspecified female breast 06/15/2014    Malignant neoplasm of upper outer quadrant of female breast   • Other conditions influencing health status 06/30/2014    Seroma infection, postoperative   • Personal history of diseases of the blood and blood-forming organs and certain disorders involving the immune mechanism     History of anemia   • Personal history of malignant neoplasm of other organs and systems 12/30/2013    History of secondary malignant neoplasm   • Personal history of other (healed) physical injury and trauma 10/14/2013    History of strain of back   • Personal history of other endocrine, nutritional and metabolic disease 09/09/2014    History of hypokalemia   • Personal history of other endocrine, nutritional and metabolic disease     History of hyperlipidemia   • Personal history of other mental and behavioral disorders 08/08/2014    History of anxiety disorder   • Personal history of other specified conditions 10/14/2013    History of diarrhea   • Personal history of other specified conditions 12/23/2013     History of breast lump   • Personal history of other specified conditions 2013    History of lymphadenopathy   • Personal history of other specified conditions 10/14/2013    History of solitary pulmonary nodule   • Secondary and unspecified malignant neoplasm of lymph node, unspecified 2014    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   • Tachycardia, unspecified 2014    Tachycardia   • Vitamin D deficiency, unspecified     Vitamin D deficiency     Past Surgical History:   Procedure Laterality Date   • BREAST LUMPECTOMY Left 2014    Breast Surgery Lumpectomy   • CT ABDOMEN PELVIS ANGIOGRAM W AND/OR WO IV CONTRAST  10/18/2013    CT ABDOMEN PELVIS ANGIOGRAM W AND/OR WO IV CONTRAST 10/18/2013 [facility]   • INCISIONAL BREAST BIOPSY  2013    Incisional Breast Biopsy   • OTHER SURGICAL HISTORY  2013    Partial Nephrectomy   • REFRACTIVE SURGERY  2013    Corneal LASIK   • TUBAL LIGATION  2013    Tubal Ligation     Family History   Problem Relation Name Age of Onset   • Breast cancer Mother  79         at 90   • Transient ischemic attack Father           90   • Other (Periperal artery disease) Father     • Breast cancer Sister  66   • Hodgkin's lymphoma Son     • Hodgkin's lymphoma Grandson  23     Social History     Tobacco Use   • Smoking status: Never   • Smokeless tobacco: Never   Vaping Use   • Vaping status: Never Used   Substance Use Topics   • Alcohol use: Not on file   • Drug use: Not on file       Physical Exam   ED Triage Vitals [25 1507]   Temperature Heart Rate Respirations BP   37.1 °C (98.7 °F) 88 20 131/86      Pulse Ox Temp src Heart Rate Source Patient Position   98 % -- -- --      BP Location FiO2 (%)     -- --       Physical Exam  Constitutional:       Appearance: Normal appearance.   HENT:      Head: Normocephalic.   Eyes:      Pupils: Pupils are equal, round, and reactive to light.   Cardiovascular:      Rate and Rhythm: Normal rate and  regular rhythm.   Pulmonary:      Effort: Pulmonary effort is normal.      Breath sounds: Normal breath sounds.   Musculoskeletal:      Cervical back: Normal range of motion.      Comments: No midline cervical thoracic or lumbar tenderness.  Patient has a very mild amount of soft tissue swelling and bruising on the radial aspect of the right wrist and mild pain with palpation of the right snuffbox.  No lateral wrist pain.  Patient can flex extend pronate supinate the wrist.  Her  strength is 5 out of 5.  Radial pulse 2+ for exposure.  Brisk capillary refill.  No break in the skin.   Skin:     General: Skin is warm and dry.   Neurological:      General: No focal deficit present.      Mental Status: She is alert.   Psychiatric:         Mood and Affect: Mood normal.         Behavior: Behavior normal.           ED Course & MDM   Diagnoses as of 02/11/25 1647   Closed nondisplaced fracture of scaphoid of right wrist, unspecified portion of scaphoid, initial encounter                 No data recorded     [place] Coma Scale Score: 15 (02/11/25 1508 : Manjinder Oconnell RN)                           Medical Decision Making  Patient presents emergency department after sustaining a FOOSH injury earlier today with a mechanical fall.  Patient did not sustain head injury.  Differential diagnosis includes wrist sprain, occult scaphoid injury, wrist fracture, median nerve injury, contusion.  Patient does have range of motion of the right wrist.  Based on the mechanism and exam I have concern for an occult scaphoid fracture.  A three-view x-ray is obtained with an impression of mild arthritic changes in the right wrist.  An old healed fracture deformity at the proximal end of the fifth metacarpal.  On the lateral view only there is a possible cortical avulsion fracture posterior to the distal carpal row.  Patient is placed in a Velcro right sided thumb splint by nursing staff.  After the splint is placed she has an intact  neurovascular exam.  I examined the patient both before and after Velcro splint placement.  Patient has an intact neurovascular exam, low suspicion for median nerve injury.  I recommend rest ice elevation of the injury.  She will take over-the-counter Tylenol for pain control.  Patient is referred to orthopedics to follow-up in 1 week where she will follow-up at the [facility] medicine clinic.  She understands return precautions.  All jewelry is removed from the fingers.  Patient discharged home in stable condition.        Procedure  Procedures     Tova Muhammad PA-C  02/11/25 5771

## 2025-02-18 ENCOUNTER — APPOINTMENT (OUTPATIENT)
Dept: ORTHOPEDIC SURGERY | Facility: CLINIC | Age: 80
End: 2025-02-18
Payer: MEDICARE

## 2025-02-18 DIAGNOSIS — M25.531 RIGHT WRIST PAIN: Primary | ICD-10-CM

## 2025-02-18 PROCEDURE — 99203 OFFICE O/P NEW LOW 30 MIN: CPT | Performed by: ORTHOPAEDIC SURGERY

## 2025-02-18 PROCEDURE — 1036F TOBACCO NON-USER: CPT | Performed by: ORTHOPAEDIC SURGERY

## 2025-02-18 PROCEDURE — 1157F ADVNC CARE PLAN IN RCRD: CPT | Performed by: ORTHOPAEDIC SURGERY

## 2025-02-18 PROCEDURE — 1159F MED LIST DOCD IN RCRD: CPT | Performed by: ORTHOPAEDIC SURGERY

## 2025-02-18 NOTE — PROGRESS NOTES
Chief Complaint   Chief Complaint   Patient presents with    Right Wrist - Pain         HPI:      Jolanta Pickering is a pleasant 79 y.o. year-old female who is seen today for fell injuring her right wrist seen emergency room placed in a thumb spica splint she feels much better now she is right-hand dominant    Review of Systems all other body systems have been reviewed and are negative for complaint.  See intake sheet which was reviewed and scanned in the chart    There were no vitals filed for this visit.    Past Medical History:   Diagnosis Date    Cyst of kidney, acquired     Cyst, kidney, acquired    Malignant neoplasm of upper-outer quadrant of unspecified female breast 06/15/2014    Malignant neoplasm of upper outer quadrant of female breast    Other conditions influencing health status 06/30/2014    Seroma infection, postoperative    Personal history of diseases of the blood and blood-forming organs and certain disorders involving the immune mechanism     History of anemia    Personal history of malignant neoplasm of other organs and systems 12/30/2013    History of secondary malignant neoplasm    Personal history of other (healed) physical injury and trauma 10/14/2013    History of strain of back    Personal history of other endocrine, nutritional and metabolic disease 09/09/2014    History of hypokalemia    Personal history of other endocrine, nutritional and metabolic disease     History of hyperlipidemia    Personal history of other mental and behavioral disorders 08/08/2014    History of anxiety disorder    Personal history of other specified conditions 10/14/2013    History of diarrhea    Personal history of other specified conditions 12/23/2013    History of breast lump    Personal history of other specified conditions 12/23/2013    History of lymphadenopathy    Personal history of other specified conditions 10/14/2013    History of solitary pulmonary nodule    Secondary and unspecified malignant neoplasm  of lymph node, 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    Tachycardia, unspecified 05/21/2014    Tachycardia    Vitamin D deficiency, unspecified     Vitamin D deficiency     Patient Active Problem List   Diagnosis    Esophageal reflux    Fatigue    Hypertension    Insomnia    Migraine headache    Osteopenia    Personal history of breast cancer    Recurrent major depressive disorder (CMS-HCC)    Renal cell carcinoma (Multi)    Sensorineural hearing loss, bilateral    Acquired cystic kidney disease    Arthralgia of hip    Ascending aortic aneurysm (CMS-HCC)    Dental abscess    Difficult airway    Dog bite of hand    Fibrocystic breast changes    History of anemia    Impacted cerumen    Nuclear senile cataract    Situational anxiety    Telangiectasia of skin    Skin lesion    Telangiectasia disorder    Snoring    Unawareness    Hypercholesteremia       Medication Documentation Review Audit       Reviewed by Casie Pradhan MA (Medical Assistant) on 02/18/25 at 0852      Medication Order Taking? Sig Documenting Provider Last Dose Status   buPROPion XL (Wellbutrin XL) 150 mg 24 hr tablet [id number]  TAKE ONE TABLET BY MOUTH EVERY DAY Lili Rea MD  Active   calcium carbonate 600 mg calcium (1,500 mg) tablet [id number] No Take 1 tablet (1,500 mg) by mouth once daily. Historical Provider, MD Taking Active   citalopram (CeleXA) 20 mg tablet [id number]  TAKE ONE TABLET BY MOUTH EVERY DAY Lili Rea MD  Active   clopidogrel (Plavix) 75 mg tablet [id number]  TAKE ONE TABLET (75MG) BY MOUTH DAILY iLli Rea MD  Active   prochlorperazine (Compazine) 10 mg tablet [id number] No Insert 1 tablet (10 mg) into the rectum 3 times a day as needed for nausea or vomiting. Historical Provider, MD Taking Active   rosuvastatin (Crestor) 10 mg tablet [id number]  Take 1 tablet (10 mg) by mouth once daily. Lili Rea MD  Active   traZODone (Desyrel) 100 mg tablet [id number]  take one to two  tablets by mouth once daily at bedtime as needed for sleep Lili Rea MD  Active   tretinoin (Retin-A) 0.025 % cream [id number]  apply topically once daily at bedtime Lili Rea MD  Active   verapamil ER (Veralan PM) 100 mg 24 hr capsule [id number]  TAKE 1 CAPSULE BY MOUTH DAILY Lili Rea MD  Active                    Allergies   Allergen Reactions    Iodine Other and Unknown     Nausea (Injected only)    Tetracyclines Other     esphagitis       Social History     Socioeconomic History    Marital status:      Spouse name: Not on file    Number of children: Not on file    Years of education: Not on file    Highest education level: Not on file   Occupational History    Not on file   Tobacco Use    Smoking status: Never    Smokeless tobacco: Never   Vaping Use    Vaping status: Never Used   Substance and Sexual Activity    Alcohol use: Not on file    Drug use: Not on file    Sexual activity: Not on file   Other Topics Concern    Not on file   Social History Narrative    Orginally from [place]  since 2016     She has 2 sons 5 grandchildren 2 granddaughters and 3 grandsons     1 son and 3 grandchildren are here the other son and 2 grandchildren living in [place]    Retired  teacher by  education     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 [employer]     Her diet is healthy overall    She does not exercise routinely but is active at home and walks her dog  trying to walk the halls    She has never been a smoker     She does not drink alcohol     Social Drivers of Health     Financial Resource Strain: Not on file   Food Insecurity: Not on file   Transportation Needs: Not on file   Physical Activity: Not on file   Stress: Not on file   Social Connections: Not on file   Intimate Partner Violence: Not on file   Housing Stability: Not on file       Past Surgical History:   Procedure Laterality Date    BREAST LUMPECTOMY Left 06/30/2014    Breast  Surgery Lumpectomy    CT ABDOMEN PELVIS ANGIOGRAM W AND/OR WO IV CONTRAST  10/18/2013    CT ABDOMEN PELVIS ANGIOGRAM W AND/OR WO IV CONTRAST 10/18/2013 [facility]    INCISIONAL BREAST BIOPSY  06/25/2013    Incisional Breast Biopsy    OTHER SURGICAL HISTORY  05/31/2013    Partial Nephrectomy    REFRACTIVE SURGERY  05/31/2013    Corneal LASIK    TUBAL LIGATION  06/25/2013    Tubal Ligation       There is no height or weight on file to calculate BMI.    HgA1c:   Lab Results   Component Value Date    HGBA1C 5.2 01/23/2025    JCQOAPOM3N 103 01/23/2025       Physical Exam:  Constitutional: Well-developed well-nourished   Eyes: Sclerae anicteric, pupils equal and round  HENT: Normocephalic atraumatic  Cardiovascular: Pulses full, regular rate and rhythm  Respiratory: Breathing not labored, no wheezing  Integumentary: Skin intact, no lesions or rashes  Neurological: Sensation intact, no gross strength deficits, reflexes equal  Psychiatric: Alert oriented and appropriate  Hematologic/lymphatic: No lymphadenopathy  Right wrist some dorsal bruising she has some tenderness generally about the radial aspect of the wrist but mobility is full nerve tendon function intact          Imaging:  X-rays show an avulsion I do not see any scaphoid fracture        Impression/Plan:  Wrist avulsion fracture rule out scaphoid fracture continue thumb spica splint removal 2 weeks if  she needs to come in for repeat examination and x-rays

## 2025-03-04 ENCOUNTER — APPOINTMENT (OUTPATIENT)
Dept: ORTHOPEDIC SURGERY | Facility: CLINIC | Age: 80
End: 2025-03-04
Payer: MEDICARE

## 2025-03-11 ENCOUNTER — OFFICE VISIT (OUTPATIENT)
Dept: ORTHOPEDIC SURGERY | Facility: CLINIC | Age: 80
End: 2025-03-11
Payer: MEDICARE

## 2025-03-11 DIAGNOSIS — M19.031 LOCALIZED PRIMARY OSTEOARTHRITIS OF WRIST, RIGHT: Primary | ICD-10-CM

## 2025-03-11 PROCEDURE — 99204 OFFICE O/P NEW MOD 45 MIN: CPT | Performed by: ORTHOPAEDIC SURGERY

## 2025-03-11 PROCEDURE — 1160F RVW MEDS BY RX/DR IN RCRD: CPT | Performed by: ORTHOPAEDIC SURGERY

## 2025-03-11 PROCEDURE — 1157F ADVNC CARE PLAN IN RCRD: CPT | Performed by: ORTHOPAEDIC SURGERY

## 2025-03-11 PROCEDURE — 1036F TOBACCO NON-USER: CPT | Performed by: ORTHOPAEDIC SURGERY

## 2025-03-11 PROCEDURE — 99214 OFFICE O/P EST MOD 30 MIN: CPT | Performed by: ORTHOPAEDIC SURGERY

## 2025-03-11 PROCEDURE — 1159F MED LIST DOCD IN RCRD: CPT | Performed by: ORTHOPAEDIC SURGERY

## 2025-03-11 NOTE — PROGRESS NOTES
CHIEF COMPLAINT         Right hand pain    ASSESSMENT + PLAN     ***        HISTORY OF PRESENT ILLNESS       Patient is a 79 y.o. ***-hand dominant female ***, who presents today for evaluation of ***      REVIEW OF SYSTEMS       A 30-item multi-system Review Of Systems was obtained on today's intake form.  This was reviewed with the patient and is correct.  The pertinent positives and negatives are listed above.  The form has been scanned separately into the medical record.      PHYSICAL EXAM    Constitutional:    Appears stated age. Well-developed and well-nourished ***.  Psychiatric:         Pleasant normal mood and affect. Behavior is appropriate for the situation.   Head:                   Normocephalic and atraumatic.  Eyes:                    Pupils are equal and round.  Cardiovascular:  2+ radial and ulnar pulses. Fingers well-perfused.  Respiratory:        Effort normal. No respiratory distress. Speaking in complete sentences.  Neurologic:       Alert and oriented to person, place, and time.  Skin:                Skin is intact, warm and dry.  Hematologic / Lymphatic:    No lymphedema or lymphangitis.    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 06/15/2014    Malignant neoplasm of upper outer quadrant of female breast    Other conditions influencing health status 06/30/2014    Seroma infection, postoperative    Personal history of diseases of the blood and blood-forming organs and certain disorders involving the immune mechanism     History of anemia    Personal history of malignant neoplasm of other organs and systems 12/30/2013    History of secondary malignant neoplasm    Personal history of other (healed) physical injury and trauma 10/14/2013    History of strain of back     Personal history of other endocrine, nutritional and metabolic disease 09/09/2014    History of hypokalemia    Personal history of other endocrine, nutritional and metabolic disease     History of hyperlipidemia    Personal history of other mental and behavioral disorders 08/08/2014    History of anxiety disorder    Personal history of other specified conditions 10/14/2013    History of diarrhea    Personal history of other specified conditions 12/23/2013    History of breast lump    Personal history of other specified conditions 12/23/2013    History of lymphadenopathy    Personal history of other specified conditions 10/14/2013    History of solitary pulmonary nodule    Secondary and unspecified malignant neoplasm of lymph node, 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 Sig Documenting Provider Last Dose Status   buPROPion XL (Wellbutrin XL) 150 mg 24 hr tablet [id number]  TAKE ONE TABLET BY MOUTH EVERY DAY Lili Rea MD  Active   calcium carbonate 600 mg calcium (1,500 mg) tablet [id number] No Take 1 tablet (1,500 mg) by mouth once daily. Historical Provider, MD Taking Active   citalopram (CeleXA) 20 mg tablet [id number]  TAKE ONE TABLET BY MOUTH EVERY DAY Lili Rea MD  Active   clopidogrel (Plavix) 75 mg tablet [id number]  TAKE ONE TABLET (75MG) BY MOUTH DAILY Lili Rea MD  Active   prochlorperazine (Compazine) 10 mg tablet [id number] No Insert 1 tablet (10 mg) into the rectum 3 times a day as needed for nausea or vomiting. Historical Provider, MD Taking Active   rosuvastatin (Crestor) 10 mg tablet [id number]  Take 1 tablet (10 mg) by mouth once daily. Lili Rea MD  Active   traZODone (Desyrel) 100  mg tablet [id number]  take one to two tablets by mouth once daily at bedtime as needed for sleep Lili Rea MD  Active   tretinoin (Retin-A) 0.025 % cream [id number]  apply topically once daily at bedtime Lili Rea MD  Active   verapamil ER (Veralan PM) 100 mg 24 hr capsule [id number]  TAKE 1 CAPSULE BY MOUTH DAILY Lili Rea MD  Active                    Allergies   Allergen Reactions    Iodine Other and Unknown     Nausea (Injected only)    Tetracyclines Other     esphagitis       Social History     Socioeconomic History    Marital status:      Spouse name: Not on file    Number of children: Not on file    Years of education: Not on file    Highest education level: Not on file   Occupational History    Not on file   Tobacco Use    Smoking status: Never    Smokeless tobacco: Never   Vaping Use    Vaping status: Never Used   Substance and Sexual Activity    Alcohol use: Not on file    Drug use: Not on file    Sexual activity: Not on file   Other Topics Concern    Not on file   Social History Narrative    Orginally from [place]  since 2016     She has 2 sons 5 grandchildren 2 granddaughters and 3 grandsons     1 son and 3 grandchildren are here the other son and 2 grandchildren living in [place]    Retired  teacher by  education     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 [employer]     Her diet is healthy overall    She does not exercise routinely but is active at home and walks her dog  trying to walk the halls    She has never been a smoker     She does not drink alcohol     Social Drivers of Health     Financial Resource Strain: Not on file   Food Insecurity: Not on file   Transportation Needs: Not on file   Physical Activity: Not on file   Stress: Not on file   Social Connections: Not on file   Intimate Partner Violence: Not on file   Housing Stability: Not on file       Past Surgical History:   Procedure Laterality Date    BREAST  LUMPECTOMY Left 06/30/2014    Breast Surgery Lumpectomy    CT ABDOMEN PELVIS ANGIOGRAM W AND/OR WO IV CONTRAST  10/18/2013    CT ABDOMEN PELVIS ANGIOGRAM W AND/OR WO IV CONTRAST 10/18/2013 [facility]    INCISIONAL BREAST BIOPSY  06/25/2013    Incisional Breast Biopsy    OTHER SURGICAL HISTORY  05/31/2013    Partial Nephrectomy    REFRACTIVE SURGERY  05/31/2013    Corneal LASIK    TUBAL LIGATION  06/25/2013    Tubal Ligation         Electronically Signed      DIDIER Gonzalez MD      Orthopa[facility]      [phone]   LUMPECTOMY Left 06/30/2014    Breast Surgery Lumpectomy    CT ABDOMEN PELVIS ANGIOGRAM W AND/OR WO IV CONTRAST  10/18/2013    CT ABDOMEN PELVIS ANGIOGRAM W AND/OR WO IV CONTRAST 10/18/2013 [facility]    INCISIONAL BREAST BIOPSY  06/25/2013    Incisional Breast Biopsy    OTHER SURGICAL HISTORY  05/31/2013    Partial Nephrectomy    REFRACTIVE SURGERY  05/31/2013    Corneal LASIK    TUBAL LIGATION  06/25/2013    Tubal Ligation         Electronically Signed      DIDIER Gonzalez MD      [facility]      [phone]

## 2025-03-11 NOTE — Clinical Note
March 12, 2025     Lili Rea MD  [address] [place] [address]    Patient: Jolanta Pickering   YOB: 1945   Date of Visit: 3/11/2025       Dear Dr. Lili Rea MD:    Thank you for referring Jolanta Pickering to me for evaluation. Below are my notes for this consultation.  If you have questions, please do not hesitate to call me. I look forward to following your patient along with you.       Sincerely,     Nacho Gonzalez MD      CC: No Recipients  ______________________________________________________________________________________

## 2025-03-14 DIAGNOSIS — F33.0 MILD EPISODE OF RECURRENT MAJOR DEPRESSIVE DISORDER (CMS-HCC): ICD-10-CM

## 2025-03-14 DIAGNOSIS — F33.9 RECURRENT MAJOR DEPRESSIVE DISORDER, REMISSION STATUS UNSPECIFIED (CMS-HCC): ICD-10-CM

## 2025-03-14 DIAGNOSIS — I77.1 SUBCLAVIAN ARTERIAL STENOSIS: ICD-10-CM

## 2025-03-14 RX ORDER — CITALOPRAM 20 MG/1
20 TABLET, FILM COATED ORAL DAILY
Qty: 90 TABLET | Refills: 0 | Status: SHIPPED | OUTPATIENT
Start: 2025-03-14

## 2025-03-14 RX ORDER — BUPROPION HYDROCHLORIDE 150 MG/1
150 TABLET ORAL DAILY
Qty: 90 TABLET | Refills: 0 | Status: SHIPPED | OUTPATIENT
Start: 2025-03-14

## 2025-03-14 RX ORDER — CLOPIDOGREL BISULFATE 75 MG/1
75 TABLET ORAL DAILY
Qty: 30 TABLET | Refills: 0 | Status: SHIPPED | OUTPATIENT
Start: 2025-03-14

## 2025-03-29 PROBLEM — M19.031: Status: ACTIVE | Noted: 2025-03-29

## 2025-04-08 DIAGNOSIS — L70.9 ADULT ACNE: ICD-10-CM

## 2025-04-08 RX ORDER — TRETINOIN 0.25 MG/G
CREAM TOPICAL NIGHTLY
Qty: 20 G | Refills: 3 | Status: SHIPPED | OUTPATIENT
Start: 2025-04-08

## 2025-04-20 DIAGNOSIS — I77.1 SUBCLAVIAN ARTERIAL STENOSIS: ICD-10-CM

## 2025-04-21 RX ORDER — CLOPIDOGREL BISULFATE 75 MG/1
75 TABLET ORAL DAILY
Qty: 30 TABLET | Refills: 0 | Status: SHIPPED | OUTPATIENT
Start: 2025-04-21

## 2025-04-30 ENCOUNTER — LAB (OUTPATIENT)
Dept: LAB | Facility: HOSPITAL | Age: 80
End: 2025-04-30
Payer: MEDICARE

## 2025-04-30 DIAGNOSIS — E78.00 PURE HYPERCHOLESTEROLEMIA, UNSPECIFIED: ICD-10-CM

## 2025-04-30 DIAGNOSIS — Z86.39 PERSONAL HISTORY OF OTHER ENDOCRINE, NUTRITIONAL AND METABOLIC DISEASE: Primary | ICD-10-CM

## 2025-04-30 LAB
ALBUMIN SERPL BCP-MCNC: 4.1 G/DL (ref 3.4–5)
ALP SERPL-CCNC: 31 U/L (ref 33–136)
ALT SERPL W P-5'-P-CCNC: 15 U/L (ref 7–45)
AST SERPL W P-5'-P-CCNC: 15 U/L (ref 9–39)
BILIRUB DIRECT SERPL-MCNC: 0.2 MG/DL (ref 0–0.3)
BILIRUB SERPL-MCNC: 0.6 MG/DL (ref 0–1.2)
CHOLEST SERPL-MCNC: 147 MG/DL (ref 0–199)
CHOLESTEROL/HDL RATIO: 3
HDLC SERPL-MCNC: 49 MG/DL
LDLC SERPL CALC-MCNC: 81 MG/DL
NON HDL CHOLESTEROL: 98 MG/DL (ref 0–149)
PROT SERPL-MCNC: 6.3 G/DL (ref 6.4–8.2)
TRIGL SERPL-MCNC: 84 MG/DL (ref 0–149)
VLDL: 17 MG/DL (ref 0–40)

## 2025-04-30 PROCEDURE — 80076 HEPATIC FUNCTION PANEL: CPT

## 2025-04-30 PROCEDURE — 36415 COLL VENOUS BLD VENIPUNCTURE: CPT

## 2025-05-03 DIAGNOSIS — Z86.39 HISTORY OF ELEVATED LIPIDS: ICD-10-CM

## 2025-05-03 DIAGNOSIS — E78.00 HYPERCHOLESTEREMIA: ICD-10-CM

## 2025-05-29 ENCOUNTER — OFFICE VISIT (OUTPATIENT)
Dept: PRIMARY CARE | Facility: CLINIC | Age: 80
End: 2025-05-29
Payer: MEDICARE

## 2025-05-29 VITALS
DIASTOLIC BLOOD PRESSURE: 78 MMHG | WEIGHT: 157.85 LBS | SYSTOLIC BLOOD PRESSURE: 124 MMHG | BODY MASS INDEX: 29.83 KG/M2

## 2025-05-29 DIAGNOSIS — M79.10 MYALGIA: Primary | ICD-10-CM

## 2025-05-29 DIAGNOSIS — I10 PRIMARY HYPERTENSION: ICD-10-CM

## 2025-05-29 DIAGNOSIS — E78.00 HYPERCHOLESTEREMIA: ICD-10-CM

## 2025-05-29 PROBLEM — I78.1 TELANGIECTASIA DISORDER: Status: RESOLVED | Noted: 2024-08-27 | Resolved: 2025-05-29

## 2025-05-29 PROBLEM — S61.459A DOG BITE OF HAND: Status: RESOLVED | Noted: 2024-08-27 | Resolved: 2025-05-29

## 2025-05-29 PROBLEM — M19.031: Status: RESOLVED | Noted: 2025-03-29 | Resolved: 2025-05-29
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
## 2025-05-29 PROBLEM — N60.19 FIBROCYSTIC BREAST CHANGES: Status: RESOLVED | Noted: 2024-08-27 | Resolved: 2025-05-29

## 2025-05-29 PROBLEM — M25.559 ARTHRALGIA OF HIP: Status: RESOLVED | Noted: 2024-08-27 | Resolved: 2025-05-29

## 2025-05-29 PROBLEM — K04.7 DENTAL ABSCESS: Status: RESOLVED | Noted: 2024-08-27 | Resolved: 2025-05-29

## 2025-05-29 PROBLEM — I78.1 TELANGIECTASIA OF SKIN: Status: RESOLVED | Noted: 2024-08-27 | Resolved: 2025-05-29

## 2025-05-29 PROBLEM — H61.20 IMPACTED CERUMEN: Status: RESOLVED | Noted: 2024-08-27 | Resolved: 2025-05-29

## 2025-05-29 PROCEDURE — 1036F TOBACCO NON-USER: CPT | Performed by: INTERNAL MEDICINE

## 2025-05-29 PROCEDURE — 99213 OFFICE O/P EST LOW 20 MIN: CPT | Performed by: INTERNAL MEDICINE

## 2025-05-29 PROCEDURE — 1159F MED LIST DOCD IN RCRD: CPT | Performed by: INTERNAL MEDICINE

## 2025-05-29 PROCEDURE — 3078F DIAST BP <80 MM HG: CPT | Performed by: INTERNAL MEDICINE

## 2025-05-29 PROCEDURE — 3074F SYST BP LT 130 MM HG: CPT | Performed by: INTERNAL MEDICINE

## 2025-05-29 PROCEDURE — 1160F RVW MEDS BY RX/DR IN RCRD: CPT | Performed by: INTERNAL MEDICINE

## 2025-05-29 NOTE — PATIENT INSTRUCTIONS
As we discussed, the muscle aches and pains could be coming from your statin but I would like to make sure there is no evidence of an inflammatory process called polymyalgia rheumatica or PMR.  We are obtaining blood work for inflammatory markers as well as a muscle enzyme and thyroid.  In the meantime stop the Crestor for the next 2.  If your symptoms resolve it may be the Crestor.  We may rechallenge .  If the symptoms do not resolve it is not the Crestor.  As we discussed you could stop the Wellbutrin if you would like to see if you feel any differently without it  I do think water exercise could be a very good option for you

## 2025-05-29 NOTE — PROGRESS NOTES
Subjective   Patient ID: Jolanta Pickering is a 79 y.o. female.    HPI  Patient presents today with complaints of muscle aches and pains ongoing now over the last several months it is not every day not necessarily in the morning but does seem to be worse if she has been sedentary for any time it seems to be more in the shoulders and in the thighs she was concerned that this was from her statin therapy she is already taking the Crestor on alternate days.  She does inquire whether Zetia would be a good option for her  Also not sure if the Wellbutrin is doing much from the depression anxiety standpoint interested in stopping this  Considering water exercise  The muscle aches and pains do not really stop her from doing her normal activities she remains active she travels frequently going to be doing a really long bus trip on [address] from [place] to [place] also has a big cruise planned for the fall as well    Past medical history significant for  Breast cancer  Osteopenia  Renal cell cancer next line subclavian artery stenosis remains on antiplatelet therapy  Headaches  Hypertension  Anxiety and depression  Chronic insomnia trazodone is working well  Chronic constipation        Review of Systems    Objective   Physical Exam  Well-developed appears markedly younger than her age in no acute distress  HEENT exam is unremarkable  Lungs are clear to auscultation percussion  Cardiovascular regular rate and rhythm  There is no joint erythema or swelling noted  She has pretty good range of motion of her shoulders able to reach over her head  Able to stand up unassisted without using her arms from seated position    Assessment/Plan   #1 myalgias ongoing for several months etiology unclear though as we discussed certainly could be from statin therapy we are going to check inflammatory markers including a sed rate and CRP CPK as well and TSH she will stop her statin therapy over the next 2 to 3 weeks we will see if she has  resolution of her symptoms if she does we may consider rechallenge in a few weeks.  Could consider Zetia alone but we need to see if cholesterol is adequately well-controlled  2.  Depression anxiety really seems to be doing well overall no real reason to stop the Wellbutrin but if she does not think it is adding much to her treatment we could certainly stop she could just see how she feels  3.  Headaches have been well-controlled no reason to stop the verapamil  4.  Chronic insomnia she does well with the trazodone we can continue that as well

## 2025-05-30 LAB
CK SERPL-CCNC: 42 U/L (ref 18–225)
CRP SERPL-MCNC: <3 MG/L
ERYTHROCYTE [SEDIMENTATION RATE] IN BLOOD BY WESTERGREN METHOD: 9 MM/H
TSH SERPL-ACNC: 2.13 MIU/L (ref 0.4–4.5)

## 2025-06-01 DIAGNOSIS — F33.0 MILD EPISODE OF RECURRENT MAJOR DEPRESSIVE DISORDER: ICD-10-CM

## 2025-06-01 DIAGNOSIS — I77.1 SUBCLAVIAN ARTERIAL STENOSIS: ICD-10-CM

## 2025-06-01 DIAGNOSIS — F33.9 RECURRENT MAJOR DEPRESSIVE DISORDER, REMISSION STATUS UNSPECIFIED: ICD-10-CM

## 2025-06-01 RX ORDER — CITALOPRAM 20 MG/1
20 TABLET ORAL DAILY
Qty: 90 TABLET | Refills: 0 | Status: SHIPPED | OUTPATIENT
Start: 2025-06-01

## 2025-06-01 RX ORDER — CLOPIDOGREL BISULFATE 75 MG/1
75 TABLET ORAL DAILY
Qty: 30 TABLET | Refills: 0 | Status: SHIPPED | OUTPATIENT
Start: 2025-06-01

## 2025-06-01 RX ORDER — BUPROPION HYDROCHLORIDE 150 MG/1
150 TABLET ORAL DAILY
Qty: 90 TABLET | Refills: 0 | Status: SHIPPED | OUTPATIENT
Start: 2025-06-01

## 2025-06-23 NOTE — PROGRESS NOTES
Subjective   Patient ID: Jolanta Pickering is a 79 y.o. female.    HPI  Patient presents today with complaints of right hip pain  right lateral thigh area there was no injury.  It hurts sometimes to press on the area that hurts if she has been still for any time.  He goes to get up but sometimes hurts when walking but seems to improve as she is walking she denies any groin pain        We had recently seen her with rather diffuse myalgias  5/29 blood work was unrevealing.  She is stopped her statin therapy thinking this could be contributing    Past medical history significant for    Breast cancer  Osteopenia  Renal cell cancer next line subclavian artery stenosis remains on antiplatelet therapy  Headaches  Hypertension  Anxiety and depression  Chronic insomnia trazodone is working well  Chronic constipation    Review of Systems    Objective   Physical Exam  Well-developed appears younger than her age no acute distress  Clear to auscultation percussion  Cardiovascular regular rate and rhythm  There is pain to palpation in the area of the greater trochanter patient pain to internal and external rotation questional little pain to straight leg raise when she is lowering her leg  Her gait is normal    Assessment/Plan   #1 right hip pain consistent with a trochanteric bursitis we are going to obtain an x-ray of her hip however we will start meloxicam 15 mg daily for the next couple of weeks I given her some exercises or stretches to do as well if she has not improved I would recommend sports medicine to consider injection  2.  Hypercholesteremia she stopped her statin a few weeks ago feeling better from the general myalgias standpoint recheck cholesterol in the next couple of weeks    30 minutes were spent with patient of which greater than 50% was spent in counseling and coordination of care

## 2025-06-24 ENCOUNTER — HOSPITAL ENCOUNTER (OUTPATIENT)
Dept: RADIOLOGY | Facility: HOSPITAL | Age: 80
Discharge: HOME | End: 2025-06-24
Payer: MEDICARE

## 2025-06-24 ENCOUNTER — OFFICE VISIT (OUTPATIENT)
Dept: PRIMARY CARE | Facility: CLINIC | Age: 80
End: 2025-06-24
Payer: MEDICARE

## 2025-06-24 VITALS — SYSTOLIC BLOOD PRESSURE: 118 MMHG | DIASTOLIC BLOOD PRESSURE: 78 MMHG

## 2025-06-24 DIAGNOSIS — M25.551 RIGHT HIP PAIN: ICD-10-CM

## 2025-06-24 DIAGNOSIS — E78.00 HYPERCHOLESTEREMIA: ICD-10-CM

## 2025-06-24 DIAGNOSIS — M70.61 TROCHANTERIC BURSITIS OF RIGHT HIP: Primary | ICD-10-CM

## 2025-06-24 PROCEDURE — 3078F DIAST BP <80 MM HG: CPT | Performed by: INTERNAL MEDICINE

## 2025-06-24 PROCEDURE — 73502 X-RAY EXAM HIP UNI 2-3 VIEWS: CPT | Mod: RIGHT SIDE | Performed by: RADIOLOGY

## 2025-06-24 PROCEDURE — 73502 X-RAY EXAM HIP UNI 2-3 VIEWS: CPT | Mod: RT

## 2025-06-24 PROCEDURE — 99214 OFFICE O/P EST MOD 30 MIN: CPT | Performed by: INTERNAL MEDICINE

## 2025-06-24 PROCEDURE — 1159F MED LIST DOCD IN RCRD: CPT | Performed by: INTERNAL MEDICINE

## 2025-06-24 PROCEDURE — 3074F SYST BP LT 130 MM HG: CPT | Performed by: INTERNAL MEDICINE

## 2025-06-24 RX ORDER — MELOXICAM 15 MG/1
15 TABLET ORAL DAILY
Qty: 30 TABLET | Refills: 0 | Status: SHIPPED | OUTPATIENT
Start: 2025-06-24 | End: 2026-06-24

## 2025-06-24 NOTE — PATIENT INSTRUCTIONS
As always it was good to see you  Your symptoms are consistent with a trochanteric bursitis.  We are going to obtain an x-ray of your hip  I would like to start meloxicam 15 mg daily for the next couple of weeks due to the exercises we discussed including figure 8 stretch stretching the right leg over the extended left leg laying on your left side.  Trying a rolling pin or a foam roller  If you are not seeing good results in the next couple weeks we will have you see sports medicine to consider injection  I did put orders in to do a fasting lipid profile in the next couple of weeks

## 2025-06-28 DIAGNOSIS — I77.1 SUBCLAVIAN ARTERIAL STENOSIS: ICD-10-CM

## 2025-06-28 DIAGNOSIS — F51.01 PRIMARY INSOMNIA: ICD-10-CM

## 2025-06-28 RX ORDER — CLOPIDOGREL BISULFATE 75 MG/1
75 TABLET ORAL DAILY
Qty: 30 TABLET | Refills: 0 | Status: SHIPPED | OUTPATIENT
Start: 2025-06-28

## 2025-06-28 RX ORDER — TRAZODONE HYDROCHLORIDE 100 MG/1
TABLET ORAL
Qty: 180 TABLET | Refills: 0 | Status: SHIPPED | OUTPATIENT
Start: 2025-06-28

## 2025-07-08 DIAGNOSIS — E78.00 HYPERCHOLESTEREMIA: Primary | ICD-10-CM

## 2025-07-08 LAB
CHOLEST SERPL-MCNC: 184 MG/DL
CHOLEST/HDLC SERPL: 3.9 (CALC)
HDLC SERPL-MCNC: 47 MG/DL
LDLC SERPL CALC-MCNC: 119 MG/DL (CALC)
NONHDLC SERPL-MCNC: 137 MG/DL (CALC)
TRIGL SERPL-MCNC: 84 MG/DL

## 2025-07-10 DIAGNOSIS — E78.00 HYPERCHOLESTEREMIA: Primary | ICD-10-CM

## 2025-07-10 RX ORDER — EZETIMIBE 10 MG/1
10 TABLET ORAL DAILY
Qty: 30 TABLET | Refills: 5 | Status: SHIPPED | OUTPATIENT
Start: 2025-07-10 | End: 2026-01-06

## 2025-07-26 DIAGNOSIS — M70.61 TROCHANTERIC BURSITIS OF RIGHT HIP: ICD-10-CM

## 2025-07-26 DIAGNOSIS — I77.1 SUBCLAVIAN ARTERIAL STENOSIS: ICD-10-CM

## 2025-07-26 RX ORDER — CLOPIDOGREL BISULFATE 75 MG/1
75 TABLET ORAL DAILY
Qty: 30 TABLET | Refills: 0 | Status: SHIPPED | OUTPATIENT
Start: 2025-07-26

## 2025-07-26 RX ORDER — MELOXICAM 15 MG/1
15 TABLET ORAL DAILY
Qty: 30 TABLET | Refills: 0 | Status: SHIPPED | OUTPATIENT
Start: 2025-07-26 | End: 2025-07-26

## 2025-07-26 RX ORDER — CLOPIDOGREL BISULFATE 75 MG/1
75 TABLET ORAL DAILY
Qty: 30 TABLET | Refills: 0 | Status: SHIPPED | OUTPATIENT
Start: 2025-07-26 | End: 2025-07-26

## 2025-07-26 RX ORDER — MELOXICAM 15 MG/1
15 TABLET ORAL DAILY
Qty: 30 TABLET | Refills: 0 | Status: SHIPPED | OUTPATIENT
Start: 2025-07-26